# Patient Record
Sex: FEMALE | Race: WHITE | Employment: OTHER | URBAN - METROPOLITAN AREA
[De-identification: names, ages, dates, MRNs, and addresses within clinical notes are randomized per-mention and may not be internally consistent; named-entity substitution may affect disease eponyms.]

---

## 2018-04-13 ENCOUNTER — HOSPITAL ENCOUNTER (OUTPATIENT)
Dept: BONE DENSITY | Age: 58
Discharge: HOME | End: 2018-04-13
Attending: INTERNAL MEDICINE
Payer: COMMERCIAL

## 2018-04-13 DIAGNOSIS — N95.1 SYMPTOMATIC MENOPAUSAL OR FEMALE CLIMACTERIC STATES: ICD-10-CM

## 2018-04-13 PROCEDURE — 77080 DXA BONE DENSITY AXIAL: CPT

## 2020-01-23 ENCOUNTER — IMPORTED ENCOUNTER (OUTPATIENT)
Dept: URBAN - METROPOLITAN AREA CLINIC 1 | Facility: CLINIC | Age: 60
End: 2020-01-23

## 2020-01-23 PROBLEM — H10.023: Noted: 2020-01-23

## 2020-01-23 PROCEDURE — 92002 INTRM OPH EXAM NEW PATIENT: CPT

## 2020-01-23 NOTE — PATIENT DISCUSSION
1.  Bacterial Conjunctivitis OU - due to contact lens overwear. Begin Tobradex TID OU (erx). No contact lens wear at this time. Consider refitting CTLs once resolved. Return for an appointment in 1 week 10 with Dr. Garland Borwn.

## 2020-01-23 NOTE — PATIENT DISCUSSION
Begin Tobradex TID OU (erx). No contact lens wear. Return for an appointment in 1 week 10 with Dr. Sharmin Stuart.

## 2020-11-03 ENCOUNTER — TRANSCRIBE ORDER (OUTPATIENT)
Dept: SCHEDULING | Age: 60
End: 2020-11-03

## 2020-11-03 DIAGNOSIS — K58.1 IRRITABLE BOWEL SYNDROME WITH CONSTIPATION: ICD-10-CM

## 2020-11-03 DIAGNOSIS — R10.12 LEFT UPPER QUADRANT ABDOMINAL PAIN: Primary | ICD-10-CM

## 2021-02-02 ENCOUNTER — TRANSCRIBE ORDER (OUTPATIENT)
Dept: SCHEDULING | Age: 61
End: 2021-02-02

## 2021-02-02 DIAGNOSIS — Z13.6 SCREENING FOR ISCHEMIC HEART DISEASE: Primary | ICD-10-CM

## 2021-02-04 ENCOUNTER — OFFICE VISIT (OUTPATIENT)
Dept: ORTHOPEDIC SURGERY | Age: 61
End: 2021-02-04
Payer: COMMERCIAL

## 2021-02-04 VITALS
SYSTOLIC BLOOD PRESSURE: 96 MMHG | DIASTOLIC BLOOD PRESSURE: 65 MMHG | WEIGHT: 173.6 LBS | HEART RATE: 70 BPM | TEMPERATURE: 98.6 F | BODY MASS INDEX: 26.79 KG/M2

## 2021-02-04 DIAGNOSIS — M79.604 RIGHT LEG PAIN: Primary | ICD-10-CM

## 2021-02-04 DIAGNOSIS — M70.61 TROCHANTERIC BURSITIS OF RIGHT HIP: ICD-10-CM

## 2021-02-04 DIAGNOSIS — M76.31 IT BAND SYNDROME, RIGHT: ICD-10-CM

## 2021-02-04 PROCEDURE — 99203 OFFICE O/P NEW LOW 30 MIN: CPT | Performed by: PHYSICIAN ASSISTANT

## 2021-02-04 PROCEDURE — 73502 X-RAY EXAM HIP UNI 2-3 VIEWS: CPT | Performed by: PHYSICIAN ASSISTANT

## 2021-02-04 PROCEDURE — 73552 X-RAY EXAM OF FEMUR 2/>: CPT | Performed by: PHYSICIAN ASSISTANT

## 2021-02-04 NOTE — PROGRESS NOTES
Patient: Juan Mata                MRN: [id number]       SSN: [id number]  YOB: 1960        AGE: 61 y.o. SEX: female          PCP: Elsa Gaming MD  02/04/21    Chief Complaint   Patient presents with    Leg Pain     right       HISTORY:  Juan Mata is a 61 y.o. female with a 2-week history of worsening right upper lateral femoral pain radiating down to the right knee. No history of trauma. She has a relative who was just diagnosed with a bone cancer and is worried that she may have a bone cancer as well. Pain prohibits her ability to lay comfortably on her right side. Pain also limits her ability to stand for only short periods and walk short distances. She has tried over-the-counter Motrin Tylenol and Aleve for symptom relief does note some improvement with Aleve 1 tablet twice daily. Pain at rest dull and aching characteristic to the right upper outer thigh rating at a 2-3 on a 10 point scale and with activity pain increases upwards of an 8-9 110 point scale.       Pain Assessment  2/4/2021   Location of Pain Leg   Location Modifiers Right   Severity of Pain 3   Quality of Pain Aching   Frequency of Pain Intermittent   Aggravating Factors Standing   Relieving Factors Nothing           No results found for: HBA1C, HGBE8, FGZ5KEYN, ZZG1GLZZ  Weight Metrics 2/4/2021 5/17/2012 9/9/2011 7/12/2011 6/23/2011   Weight 173 lb 9.6 oz 170 lb 173 lb 173 lb 175 lb   BMI 26.79 kg/m2 26.22 kg/m2 26.68 kg/m2 26.68 kg/m2 -            Problem List Items Addressed This Visit     None      Visit Diagnoses     Right leg pain    -  Primary    Relevant Orders    AMB POC XRAY, FEMUR, MIN 2 VIEWS (Completed)    AMB POC X-RAY RADEX HIP UNI WITH PELVIS 2-3 VIEWS (Completed)    Trochanteric bursitis of right hip        It band syndrome, right              PAST MEDICAL HISTORY:   Past Medical History:   Diagnosis Date    Migraine        PAST SURGICAL HISTORY:   Past Surgical History:   Procedure Laterality Date    HX HYSTERECTOMY         ALLERGIES: No Known Allergies     CURRENT MEDICATIONS:  A list of medications prior to the time of admission include:  Prior to Admission medications    Medication Sig Start Date End Date Taking? Authorizing Provider   sertraline (ZOLOFT) 100 mg tablet Take  by mouth daily. Yes Provider, Historical   alprazolam (XANAX) 1 mg tablet Take 1 mg by mouth nightly as needed. Yes Provider, Historical   gabapentin (NEURONTIN) 400 mg capsule Take 100 mg by mouth daily. 3/11/11  Yes Provider, Historical   Methen-Hyos-M Blue-BA-PhSal (PROSED DS) tablet Take 1 Tab by mouth three (3) times daily. 7/12/11   Quinn Salazar MD       FAMILY HISTORY: History reviewed. No pertinent family history. SOCIAL HISTORY:   Social History     Socioeconomic History    Marital status:      Spouse name: Not on file    Number of children: Not on file    Years of education: Not on file    Highest education level: Not on file   Tobacco Use    Smoking status: Never Smoker    Smokeless tobacco: Never Used   Substance and Sexual Activity    Alcohol use: No    Drug use: No       ROS:No CP, No SOB, No fever/chills nor night sweats. No headaches, vision abnormalities to include double and oral loss of vision. No hearing abnormalities. Musculoskeletal pain per HPI. Pain is exacerbated positionally. Pt denies h/o spinal surgery, injections, or PT/chiropractor. Self treated with less than adequate relief on oral antiinflammatories. . Pt denies change in bowel or bladder habits. Pt denies fever, weight loss, or skin changes. EXAM:  Patient alert and oriented x 3,   CN II-XII grossly intact  Sitting comfortably in the exam room, interacting with conversation with pleasant affect. Breathing appears regular effortless with no visible usage of accessory muscles  Distal cap refill intact at 2/2 Alexander UE / LE.   Neuro intact Alexander UE/LE to noxious stimuli    Ortho Specific exam:    Right lower extremity reveals no warmth erythema edema ecchymosis or effusion. Patient has hip flexor strength at 5/5 on the right against resistance. Assisted at bedside right knee flexed at 90 degrees passive internal/external rotation 15 and 18 degrees respectively without pain. Point tenderness is noted over the greater trochanteric region with palpable tenderness extending proximally 4 cm from the P OMT and distally 12 cm down the IT band. Right knee range of motion 100 degrees -0 today with patella tracking midline and slight crepitation. X-rays: Pa Henley 2/4/2021 AP pelvis and a right femur reveals early degenerative changes in the femoral acetabular joint space with mild spurring seen at the superior and inferior rim of the acetabulum. No soft tissue calcifications identified. IMPRESSION:      ICD-10-CM ICD-9-CM    1. Right leg pain  M79.604 729.5 AMB POC XRAY, FEMUR, MIN 2 VIEWS      AMB POC X-RAY RADEX HIP UNI WITH PELVIS 2-3 VIEWS   2. Trochanteric bursitis of right hip  M70.61 726.5    3. It band syndrome, right  M76.31 728.89         PLAN: Today we discussed alternatives to care to include but not limited to increasing her Aleve to 2 tablets p.o. twice daily with food. She agreed. We also discussed the possibility of physical therapy for trochanteric bursal stretching and home exercise program.  She declined. For her acute pain today she was offered a cortisone injection but declined. She will follow with our office on a as needed basis. Today her x-rays reviewed copies provided all of her questions answered to her satisfaction. RICE discussed at length and patient will comply per guidelines. Patient provided a reminder for a \"due or due soon\" health maintenance. I have asked the patient to schedule an appointment with their primary care provider for follow-up on general health maintenance concerns.     Today all the patient's questions were answered to their satisfaction. Copies of x-rays reviewed if obtained this visit, and provided to patient. Dictation disclaimer:  Please note that this dictation was completed with [employer]ov[employer], the computer voice recognition software. Quite often unanticipated grammatical, syntax, homophones, and other interpretive errors are inadvertently transcribed by the computer software. Please disregard these errors. Please excuse any errors that have escaped final proofreading. Bambi Arnold  APA, APC, MPAS, PA-C  [facility]

## 2021-02-23 ENCOUNTER — HOSPITAL ENCOUNTER (OUTPATIENT)
Dept: CT IMAGING | Age: 61
Discharge: HOME | End: 2021-02-23
Attending: INTERNAL MEDICINE

## 2021-02-23 DIAGNOSIS — Z13.6 SCREENING FOR ISCHEMIC HEART DISEASE: ICD-10-CM

## 2021-02-23 PROCEDURE — 75571 CT HRT W/O DYE W/CA TEST: CPT

## 2021-03-04 ENCOUNTER — TELEPHONE (OUTPATIENT)
Dept: OTHER | Age: 61
End: 2021-03-04

## 2021-03-04 NOTE — TELEPHONE ENCOUNTER
Patient identity verified and matched to demographic data. Patient notified of Coronary Calcium Score of  0       Patient verbalized understanding of results, patient education, and follow up care.

## 2021-06-22 ENCOUNTER — TRANSCRIBE ORDER (OUTPATIENT)
Dept: SCHEDULING | Age: 61
End: 2021-06-22

## 2021-07-06 ENCOUNTER — TRANSCRIBE ORDER (OUTPATIENT)
Dept: SCHEDULING | Age: 61
End: 2021-07-06

## 2021-07-06 DIAGNOSIS — Z13.820 SPECIAL SCREENING FOR OSTEOPOROSIS: Primary | ICD-10-CM

## 2021-07-22 ENCOUNTER — HOSPITAL ENCOUNTER (OUTPATIENT)
Dept: BONE DENSITY | Age: 61
Discharge: HOME | End: 2021-07-22
Attending: INTERNAL MEDICINE
Payer: COMMERCIAL

## 2021-07-22 DIAGNOSIS — Z13.820 SPECIAL SCREENING FOR OSTEOPOROSIS: ICD-10-CM

## 2021-07-22 PROCEDURE — 77080 DXA BONE DENSITY AXIAL: CPT

## 2021-08-02 ENCOUNTER — HOSPITAL ENCOUNTER (OUTPATIENT)
Dept: GENERAL RADIOLOGY | Age: 61
Discharge: HOME | End: 2021-08-02
Payer: COMMERCIAL

## 2021-08-02 DIAGNOSIS — K59.04 CHRONIC IDIOPATHIC CONSTIPATION: ICD-10-CM

## 2021-08-02 PROCEDURE — 74018 RADEX ABDOMEN 1 VIEW: CPT

## 2021-08-04 ENCOUNTER — HOSPITAL ENCOUNTER (OUTPATIENT)
Dept: GENERAL RADIOLOGY | Age: 61
Discharge: HOME | End: 2021-08-04
Payer: COMMERCIAL

## 2021-08-04 DIAGNOSIS — K59.09 CHRONIC CONSTIPATION: ICD-10-CM

## 2021-08-04 PROCEDURE — 74018 RADEX ABDOMEN 1 VIEW: CPT

## 2021-08-06 ENCOUNTER — HOSPITAL ENCOUNTER (OUTPATIENT)
Dept: GENERAL RADIOLOGY | Age: 61
Discharge: HOME | End: 2021-08-06
Payer: COMMERCIAL

## 2021-08-06 DIAGNOSIS — K59.00 CONSTIPATION: ICD-10-CM

## 2021-08-06 PROCEDURE — 74018 RADEX ABDOMEN 1 VIEW: CPT

## 2021-11-10 ENCOUNTER — IMPORTED ENCOUNTER (OUTPATIENT)
Dept: URBAN - METROPOLITAN AREA CLINIC 1 | Facility: CLINIC | Age: 61
End: 2021-11-10

## 2021-11-10 PROBLEM — H52.4: Noted: 2021-11-10

## 2021-11-10 PROBLEM — H52.03: Noted: 2021-11-10

## 2021-11-10 PROCEDURE — 92015 DETERMINE REFRACTIVE STATE: CPT

## 2021-11-10 PROCEDURE — 92014 COMPRE OPH EXAM EST PT 1/>: CPT

## 2021-11-10 NOTE — PATIENT DISCUSSION
1.  Hyperopia: Rx was given for corrective spectacles if indicated. 2.  Presbyopia 3. Dry Eyes w/ PEK OU - ATs BID OU CTL trials orderedReturn for an appointment in 1 week cc after trial  with Dr. Brad García.

## 2021-11-22 ENCOUNTER — IMPORTED ENCOUNTER (OUTPATIENT)
Dept: URBAN - METROPOLITAN AREA CLINIC 1 | Facility: CLINIC | Age: 61
End: 2021-11-22

## 2021-11-22 NOTE — PATIENT DISCUSSION
1.  CC today -- patient happy with vision and comfort. CTL RX finalized and given to patient today. Return for an appointment in 1 year 40/cc with Dr. Araceli Stubbs.

## 2022-04-02 ASSESSMENT — KERATOMETRY
OD_K1POWER_DIOPTERS: 42.25
OS_K1POWER_DIOPTERS: 42.00
OS_K2POWER_DIOPTERS: 43.00
OD_AXISANGLE_DEGREES: 177
OD_AXISANGLE2_DEGREES: 087
OS_AXISANGLE2_DEGREES: 086
OS_AXISANGLE_DEGREES: 176
OD_K2POWER_DIOPTERS: 43.00

## 2022-04-02 ASSESSMENT — VISUAL ACUITY
OD_CC: J16
OD_SC: 20/25
OS_CC: J1
OD_CC: 20/25-2
OD_CC: 20/25
OS_CC: J2
OS_SC: 20/50-1
OS_CC: 20/25

## 2022-04-02 ASSESSMENT — TONOMETRY
OD_IOP_MMHG: 15
OS_IOP_MMHG: 16
OS_IOP_MMHG: 17
OD_IOP_MMHG: 15

## 2022-04-20 ENCOUNTER — TRANSCRIBE ORDER (OUTPATIENT)
Dept: SCHEDULING | Age: 62
End: 2022-04-20

## 2022-04-20 DIAGNOSIS — F17.211 CIGARETTE NICOTINE DEPENDENCE IN REMISSION: Primary | ICD-10-CM

## 2022-05-09 ENCOUNTER — NURSE NAVIGATOR (OUTPATIENT)
Dept: OTHER | Age: 62
End: 2022-05-09

## 2022-05-09 NOTE — PROGRESS NOTES
LMOVM regarding prescreening for LDCT lung cancer screening scheduled for 5/10/2022.  Prescreening must be complete prior to exam.      AMIRAH WalterN, RN, [address] [facility] Nurse Navigator

## 2022-05-09 NOTE — PROGRESS NOTES
Referring Provider: Eli Azar MD      Lung Cancer Risk Profile:   Age: 64  Gender: Female  Height: 67\"  Weight: 165#    Smoking History:  Smoking Status: past use  # years smokin  # years quit: 15  Packs/day: 1  Pack years: 35    Patient discussed smoking cessation with PCP: Unknown    Patient participated in shared decision making process with PCP: Unknown    Patient is currently experiencing symptoms: No, per patient report    If yes what symptoms:     Co-Morbidities:      Cancer History:      Additional Risk Factors:   Exposure to second hand smoke      Patient's smoking history discussed via phone. Patient meets LDCT lung cancer screening criteria. Appointment scheduled for 5/10/2022 verified with Ms. Shashank Murphy.       AMIRAH KaplanN, RN, [address] [facility] Nurse Navigator

## 2022-05-10 ENCOUNTER — HOSPITAL ENCOUNTER (OUTPATIENT)
Dept: CT IMAGING | Age: 62
Discharge: HOME | End: 2022-05-10
Attending: INTERNAL MEDICINE
Payer: COMMERCIAL

## 2022-05-10 VITALS — HEIGHT: 66 IN | BODY MASS INDEX: 27.32 KG/M2 | WEIGHT: 170 LBS

## 2022-05-10 DIAGNOSIS — F17.211 CIGARETTE NICOTINE DEPENDENCE IN REMISSION: ICD-10-CM

## 2022-05-10 PROCEDURE — 71271 CT THORAX LUNG CANCER SCR C-: CPT

## 2022-06-20 ENCOUNTER — DOCUMENTATION ONLY (OUTPATIENT)
Dept: PULMONOLOGY | Age: 62
End: 2022-06-20

## 2022-06-20 NOTE — PROGRESS NOTES
Called pt to екатерина np appt&pft -post covid sob-Dr Morgan Concepcion will call back to екатерина-ct [email or web address] in pending file cabinet

## 2023-04-18 DIAGNOSIS — R94.30 ABNORMAL RESULTS OF CARDIOVASCULAR FUNCTION STUDIES: Primary | ICD-10-CM

## 2023-04-18 DIAGNOSIS — R00.2 PALPITATIONS: ICD-10-CM

## 2023-07-07 ENCOUNTER — OFFICE VISIT (OUTPATIENT)
Age: 63
End: 2023-07-07
Payer: COMMERCIAL

## 2023-07-07 VITALS
HEART RATE: 74 BPM | DIASTOLIC BLOOD PRESSURE: 72 MMHG | SYSTOLIC BLOOD PRESSURE: 102 MMHG | BODY MASS INDEX: 27.48 KG/M2 | OXYGEN SATURATION: 97 % | HEIGHT: 66 IN | WEIGHT: 171 LBS

## 2023-07-07 DIAGNOSIS — R94.31 ABNORMAL EKG: ICD-10-CM

## 2023-07-07 DIAGNOSIS — I71.21 ANEURYSM OF ASCENDING AORTA WITHOUT RUPTURE (HCC): ICD-10-CM

## 2023-07-07 DIAGNOSIS — R07.9 CHEST PAIN, UNSPECIFIED TYPE: Primary | ICD-10-CM

## 2023-07-07 PROCEDURE — 99204 OFFICE O/P NEW MOD 45 MIN: CPT | Performed by: INTERNAL MEDICINE

## 2023-07-07 RX ORDER — ESCITALOPRAM OXALATE 20 MG/1
20 TABLET ORAL DAILY
COMMUNITY
Start: 2023-06-14

## 2023-07-07 RX ORDER — LINACLOTIDE 290 UG/1
290 CAPSULE, GELATIN COATED ORAL DAILY
COMMUNITY
Start: 2023-06-14

## 2023-07-07 ASSESSMENT — ANXIETY QUESTIONNAIRES
3. WORRYING TOO MUCH ABOUT DIFFERENT THINGS: 0
2. NOT BEING ABLE TO STOP OR CONTROL WORRYING: 0
IF YOU CHECKED OFF ANY PROBLEMS ON THIS QUESTIONNAIRE, HOW DIFFICULT HAVE THESE PROBLEMS MADE IT FOR YOU TO DO YOUR WORK, TAKE CARE OF THINGS AT HOME, OR GET ALONG WITH OTHER PEOPLE: NOT DIFFICULT AT ALL
7. FEELING AFRAID AS IF SOMETHING AWFUL MIGHT HAPPEN: 0
GAD7 TOTAL SCORE: 0
5. BEING SO RESTLESS THAT IT IS HARD TO SIT STILL: 0
6. BECOMING EASILY ANNOYED OR IRRITABLE: 0
4. TROUBLE RELAXING: 0
1. FEELING NERVOUS, ANXIOUS, OR ON EDGE: 0

## 2023-07-07 ASSESSMENT — PATIENT HEALTH QUESTIONNAIRE - PHQ9
2. FEELING DOWN, DEPRESSED OR HOPELESS: 0
SUM OF ALL RESPONSES TO PHQ QUESTIONS 1-9: 0
SUM OF ALL RESPONSES TO PHQ9 QUESTIONS 1 & 2: 0
SUM OF ALL RESPONSES TO PHQ QUESTIONS 1-9: 0
SUM OF ALL RESPONSES TO PHQ QUESTIONS 1-9: 0
1. LITTLE INTEREST OR PLEASURE IN DOING THINGS: 0
SUM OF ALL RESPONSES TO PHQ QUESTIONS 1-9: 0

## 2023-07-07 ASSESSMENT — ENCOUNTER SYMPTOMS
COUGH: 0
SORE THROAT: 0
ABDOMINAL DISTENTION: 0
VOMITING: 0
SHORTNESS OF BREATH: 0
ABDOMINAL PAIN: 0
NAUSEA: 0

## 2023-07-07 NOTE — PROGRESS NOTES
07/07/23     Cindy Bautista  is a 61 y.o. female     Chief Complaint   Patient presents with    New Patient     Self Ref For Chest Pain / Wants To Establish Care    Follow-Up from Hospital     ER Visit : 04-25-23 :Melinda Walden : Chest Pain       HPI    Patient presents for a new office visit. She was referred here by her PCP for evaluation of chest pain. She does not have a prior cardiac history. She underwent a coronary calcium score in 2021 which was 0. She also underwent a CT lung cancer screening of her thorax in May 2022 which showed an incidental finding of an enlarged a sending thoracic aorta measuring between 4.1 and 4.3 cm. She underwent an echocardiogram at the end of April 2023 to evaluate chest pain. This demonstrated preserved LV function, EF 60 to 65%, no regional wall motion abnormality. Mild to moderate tricuspid regurgitation with normal PA pressures. She also was found to have a moderately dilated aortic root. She was seen in the emergency room at [facility] the day after her echocardiogram where she was found to have an abnormal EKG with anteroseptal Q waves, but no acute ST-T abnormalities. Her troponins were negative. She was discharged home with recommendation for an outpatient cardiac work-up. Patient states the chest pain back in April was the most severe which she describes as a substernal pressure which radiated up into her jaw. That has not reoccurred but she does report a daily mild chest discomfort which she describes as a pressure in the middle of her chest which is nonradiating. This is nonexertional in nature. She states that she tries to exercise most days. She will swim in the pool for almost 45 minutes is never noticed any exertional chest tightness. She was also walking daily until it got too hot. No major change in her activity tolerance.     Past Medical History:   Diagnosis Date    Anxiety     Migraine      Current Outpatient Medications

## 2023-07-07 NOTE — PROGRESS NOTES
Salma Riding presents today for   Chief Complaint   Patient presents with    New Patient     Self Ref For Chest Pain / Wants To Establish Care    Follow-Up from Hospital     ER Visit : 04-25-23 :Russellelissa Negro : Chest Pain       Salma Riding preferred language for health care discussion is english/other. Is someone accompanying this pt? no    Is the patient using any DME equipment during OV? no    Depression Screening:  Depression: Not at risk    PHQ-2 Score: 0        Learning Assessment:  Who is the primary learner? Patient    What is the preferred language for health care of the primary learner? ENGLISH    How does the primary learner prefer to learn new concepts? DEMONSTRATION    Answered By patient    Relationship to Learner SELF           Pt currently taking Anticoagulant therapy? no    Pt currently taking Antiplatelet therapy ? no      Coordination of Care:  1. Have you been to the ER, urgent care clinic since your last visit? Hospitalized since your last visit? no    2. Have you seen or consulted any other health care providers outside of the [address] [place] [address] since your last visit? Include any pap smears or colon screening.  no

## 2023-07-31 ENCOUNTER — HOSPITAL ENCOUNTER (OUTPATIENT)
Facility: HOSPITAL | Age: 63
Discharge: HOME | End: 2023-08-03
Attending: INTERNAL MEDICINE
Payer: COMMERCIAL

## 2023-07-31 DIAGNOSIS — R94.31 ABNORMAL EKG: ICD-10-CM

## 2023-07-31 DIAGNOSIS — I71.21 ANEURYSM OF ASCENDING AORTA WITHOUT RUPTURE (HCC): ICD-10-CM

## 2023-07-31 LAB — CREAT UR-MCNC: 0.6 MG/DL (ref 0.6–1.3)

## 2023-07-31 PROCEDURE — 71275 CT ANGIOGRAPHY CHEST: CPT

## 2023-07-31 PROCEDURE — 6360000004 HC RX CONTRAST MEDICATION: Performed by: INTERNAL MEDICINE

## 2023-07-31 PROCEDURE — 82565 ASSAY OF CREATININE: CPT

## 2023-07-31 RX ADMIN — IOPAMIDOL 100 ML: 755 INJECTION, SOLUTION INTRAVENOUS at 10:15

## 2023-12-12 ENCOUNTER — OFFICE VISIT (OUTPATIENT)
Age: 63
End: 2023-12-12
Payer: COMMERCIAL

## 2023-12-12 VITALS — HEIGHT: 67 IN | WEIGHT: 175 LBS | BODY MASS INDEX: 27.47 KG/M2 | TEMPERATURE: 98 F

## 2023-12-12 DIAGNOSIS — M18.0 ARTHRITIS OF CARPOMETACARPAL (CMC) JOINT OF BOTH THUMBS: ICD-10-CM

## 2023-12-12 DIAGNOSIS — M79.642 LEFT HAND PAIN: ICD-10-CM

## 2023-12-12 DIAGNOSIS — M79.641 RIGHT HAND PAIN: Primary | ICD-10-CM

## 2023-12-12 PROCEDURE — 73130 X-RAY EXAM OF HAND: CPT | Performed by: PHYSICIAN ASSISTANT

## 2023-12-12 PROCEDURE — 99204 OFFICE O/P NEW MOD 45 MIN: CPT | Performed by: PHYSICIAN ASSISTANT

## 2023-12-12 NOTE — PROGRESS NOTES
with the [facility] and the [facility] as well as the intake form in the chart in the record. I have reviewed prior medical record(s) in detail regarding this patient in this care appointment. Appropriate for outpatient management. Will discuss supportive treatment, NSAIDS, RICE and orthopedic follow-up. Discussed treatment plan, return precautions, symptomatic relief, and expected time to improvement. All questions answered. Patient is stable for discharge and outpatient management. Medication use, risk/benefit, side effects, and precautions discussed. Care plan outlined and precautions discussed. Results were reviewed with the patient. All medications were reviewed with the patient. All of pt's questions and concerns were addressed. Alarm symptoms and return precautions associated with chief complaint and evaluation were reviewed with the patient in detail. The patient demonstrated adequate understanding. The patient expresses willing compliance with the treatment plan. Special note: Medication management discussed in detail all patient's questions answered to their satisfaction. Patient provided a reminder for a \"due or due soon\" health maintenance. I have asked the patient to schedule an appointment with their primary care provider for follow-up on general health maintenance concerns. Today all the patient's questions were answered to their satisfaction. Copies of x-rays reviewed if obtained this visit, and provided to patient. Dictation disclaimer:  Please note that this dictation was completed with \"Dragon\", the computer voice recognition software. Today's exam was dictated using fluency dictation software (voice recognition software). Occasionally, sound-a-like computer induced   dictation errors will populate the report. Quite often unanticipated grammatical, syntax, homophones, and other interpretive errors are inadvertently transcribed by the computer software.   Please disregard

## 2024-04-26 ENCOUNTER — HOSPITAL ENCOUNTER (OUTPATIENT)
Facility: HOSPITAL | Age: 64
Discharge: HOME | End: 2024-04-26
Payer: COMMERCIAL

## 2024-04-26 DIAGNOSIS — I71.21 ANEURYSM OF ASCENDING AORTA WITHOUT RUPTURE (HCC): ICD-10-CM

## 2024-04-26 LAB — CREAT UR-MCNC: 0.6 MG/DL (ref 0.6–1.3)

## 2024-04-26 PROCEDURE — 82565 ASSAY OF CREATININE: CPT

## 2024-04-26 PROCEDURE — 6360000004 HC RX CONTRAST MEDICATION: Performed by: INTERNAL MEDICINE

## 2024-04-26 PROCEDURE — 71275 CT ANGIOGRAPHY CHEST: CPT

## 2024-04-26 RX ADMIN — IOPAMIDOL 100 ML: 755 INJECTION, SOLUTION INTRAVENOUS at 11:00

## 2024-05-06 ENCOUNTER — HOSPITAL ENCOUNTER (OUTPATIENT)
Facility: HOSPITAL | Age: 64
Setting detail: RECURRING SERIES
Discharge: HOME | End: 2024-05-09
Payer: COMMERCIAL

## 2024-05-06 PROCEDURE — 97535 SELF CARE MNGMENT TRAINING: CPT

## 2024-05-06 PROCEDURE — 97161 PT EVAL LOW COMPLEX 20 MIN: CPT

## 2024-05-06 PROCEDURE — 97110 THERAPEUTIC EXERCISES: CPT

## 2024-05-06 NOTE — PROGRESS NOTES
PT DAILY TREATMENT NOTE/LUMBAR EVAL     Patient Name: Tina Finch    Date: 2024    : 1960  Insurance: Payor: YENI / Plan: JAMI JAIME FEDERAL / Product Type: *No Product type* /      Patient  verified yes     Visit #   Current / Total 1 24   Time   In / Out 12:30 1:10   Pain   In / Out 2/10 0   Subjective Functional Status/Changes: Low back pain radiating into R LE       Treatment Area: Low back Pain. Right hip pain [M25.551]  SUBJECTIVE  Pain Level (0-10 scale): 2/10  []constant []intermittent []improving []worsening []no change since onset    Any medication changes, allergies to medications, adverse drug reactions, diagnosis change, or new procedure performed?: [x] No    [] Yes (see summary sheet for update)  Subjective functional status/changes:     Subjective functional status/changes:     PLOF: Pt has difficulty lying on her back.   Mechanism of Injury: pt report she has had low back pain after hitting her right hip on to something. Pt had x-ray of lumbar spina and right hip and pt was told her symptoms are from sciatic nerve involvement.   Current symptoms/Complaints: 0/10 at best with Tylenol and walking; 8/10 at worst with lying on back; pt reports they are able to sleep through the night.  Previous Treatment/Compliance: Medication, resting, chiropractor  PMHx/Surgical Hx: Anxiety and migraine.   Work Hx: Retired  Living Situation: Lives with significant other in a one story house.   Pt Goals: \"trying to figure out exercises that strengthen my back\"  Barriers: [x]pain []financial []time []transportation []other  Motivation: asking questions, trying to perform skills, and interest   Substance use: []Alcohol []Tobacco []other:   Cognition: A & O x 3        OBJECTIVE/EXAMINATION      20 min [x]Eval        - untimed        Therapeutic Procedures:  Tx Min Billable or 1:1 Min (if diff from Tx Min) Procedure, Rationale, Specifics   10  18206 Therapeutic Exercise (timed):  increase ROM,

## 2024-05-06 NOTE — PROGRESS NOTES
[facility]  [address] [facility] [address] [place] [address] Ph:[phone] Fx: [phone]    PLAN OF CARE/ Statement of Necessity for Physical Therapy Services           Patient name: Tina Finch Start of Care: 2024   Referral source: America Fields MD : 1960    Medical Diagnosis: Other low back pain [M54.59]       Onset Date: 23   Treatment Diagnosis: M54.59  OTHER LOWER BACK PAIN                                     Prior Hospitalization: see medical history Provider#: [id number]   Medications: Verified on Patient Summary List     Comorbidities:  Anxiety and migraine.   Prior Level of Function: Pt has difficulty lying on her back.     The Plan of Care and following information is based on the information from the initial evaluation.    Assessment / key information:    The patient is a 63-year-old female referred to therapy with low back pain. The patient reported a current pain level of 2/10 which gets worsen to 8/10 with lying on her back . The patient reports pain started a year ago after hitting her right hip into something. During the objective assessment, the patient reported no pain during repeated movement in each plane. The patient pointed pain in right QL and SIJ area describing sharp in nature. Additionally, the patient showed positive tenderness, stiffness, and weakness in both lower extremities, resulting in functional limitations. Skilled therapy is recommended to address the above deficits and help the patient return to her prior level of function.     Evaluation Complexity:  History:  LOW Complexity : Zero comorbidities / personal factors that will impact the outcome / POC; Examination:  LOW Complexity : 1-2 Standardized tests and measures addressing body structure, function, activity limitation and / or participation in recreation  ;Presentation:  LOW Complexity : Stable, uncomplicated  ;Clinical Decision Making:  LOW

## 2024-05-07 ENCOUNTER — TELEPHONE (OUTPATIENT)
Facility: HOSPITAL | Age: 64
End: 2024-05-07

## 2024-05-15 ENCOUNTER — TELEPHONE (OUTPATIENT)
Facility: HOSPITAL | Age: 64
End: 2024-05-15

## 2024-05-31 ENCOUNTER — TELEPHONE (OUTPATIENT)
Facility: HOSPITAL | Age: 64
End: 2024-05-31

## 2024-05-31 NOTE — TELEPHONE ENCOUNTER
patient will c/b to schedule. informed her that she's coming up on her 30 day nabeel and had only been seen once.

## 2024-06-25 ENCOUNTER — TELEPHONE (OUTPATIENT)
Facility: HOSPITAL | Age: 64
End: 2024-06-25

## 2024-06-25 ENCOUNTER — HOSPITAL ENCOUNTER (OUTPATIENT)
Facility: HOSPITAL | Age: 64
Setting detail: RECURRING SERIES
Discharge: HOME | End: 2024-06-28
Payer: COMMERCIAL

## 2024-06-25 PROCEDURE — 97535 SELF CARE MNGMENT TRAINING: CPT

## 2024-06-25 PROCEDURE — 97110 THERAPEUTIC EXERCISES: CPT

## 2024-06-25 PROCEDURE — 97112 NEUROMUSCULAR REEDUCATION: CPT

## 2024-06-25 NOTE — TELEPHONE ENCOUNTER
patient decided that she will cx and d/c for now. says she is not having much pain at this moment and will come back in the winter time.

## 2024-06-25 NOTE — PROGRESS NOTES
PHYSICAL / OCCUPATIONAL THERAPY - DAILY TREATMENT NOTE     Patient Name: Tina Finch    Date: 2024    : 1960  Insurance: Payor: YENI / Plan: JAMI JAIME FEDERAL / Product Type: *No Product type* /      Patient  verified Yes     Visit #   Current / Total 2 24   Time   In / Out 1030am 1123am   Pain   In / Out 0 0   Subjective Functional Status/Changes: Pt states she hasn't been to therapy because it got warmer outside and she was able to start using her pool at home and that helped her pain. States she hasn't been having a lot of pain but does have pain at night. States she decided she would come back since she was told she needed a month of therapy. States she really wants to learn exercises to do at home during the winter to help the pain when she can't do the pool activities.    Changes to:  Allergies, Med Hx, Sx Hx?   no       TREATMENT AREA =  Other low back pain [M54.59]    OBJECTIVE        Therapeutic Procedures:  Tx Min Billable or 1:1 Min (if diff from Tx Min) Procedure, Rationale, Specifics   30  10433 Therapeutic Exercise (timed):  increase ROM, strength, coordination, balance, and proprioception to improve patient's ability to progress to PLOF and address remaining functional goals. (see flow sheet as applicable)    Details if applicable:       10  89337 Neuromuscular Re-Education (timed):  improve balance, coordination, kinesthetic sense, posture, core stability and proprioception to improve patient's ability to develop conscious control of individual muscles and awareness of position of extremities in order to progress to PLOF and address remaining functional goals. (see flow sheet as applicable)    Details if applicable:     13  49504 Self Care/Home Management (timed):  improve patient knowledge and understanding of activity modification, physical therapy expectations, procedures and progression, and HEP review  to improve patient's ability to progress to PLOF and address

## 2024-06-25 NOTE — PROGRESS NOTES
[facility]  [address] [facility] [place] [address] - Ph: [phone]   Fx: [phone]    PHYSICAL THERAPY PROGRESS NOTE      Patient name: Tina Finch Start of Care: 2024   Referral source: America Fields MD : 1960               Medical Diagnosis: Other low back pain [M54.59]        Onset Date: 23   Treatment Diagnosis: M54.59  OTHER LOWER BACK PAIN                                     Prior Hospitalization: see medical history Provider#: [id number]   Medications: Verified on Patient Summary List      Comorbidities:  Anxiety and migraine.   Prior Level of Function: Pt has difficulty lying on her back.     Visits from Start of Care: 2    Missed Visits: 0    Goals/Measure of Progress: To be achieved in 12 weeks:    Short Term Goals: To be accomplished in 4 weeks:  Patient will be independent and compliant with HEP to progress toward goals and restore functional mobility.   Eval Status: issued at eval  PN 24: pt reports she has not done the exercises at all     Patient will improve pain in low back to 6/10 at worst to improve household task tolerance.  Eval Status: 8/10 at worst  PN 24: at worst in past two weeks 6/10; MET     Long Term Goals: To be accomplished in 12 weeks:  Patient will improve FOTO score to 76 points to improve functional tolerance for recreational activities.  Eval Status: FOTO 69  FOTO score = an established functional score where 100 = no disability  PN 24: 72, progressing     2.   Pt will demonstrate no upslip on right with good QL and glute medius activation during single leg stance.   Eval Status: Right up slip and weakness of QL and glute medius.  PN 24: pt has good SLS ability      3.   Pt will have 5/5 B hip strength to return to goals of increased joint stability and mechanics while bending and lifting activities.  Eval Status:   MMT L(0-5) R (0-5)   Hip Flexion  4+ 4+   Hip

## 2024-06-27 ENCOUNTER — APPOINTMENT (OUTPATIENT)
Facility: HOSPITAL | Age: 64
End: 2024-06-27
Payer: COMMERCIAL

## 2024-07-18 ENCOUNTER — OFFICE VISIT (OUTPATIENT)
Age: 64
End: 2024-07-18
Payer: COMMERCIAL

## 2024-07-18 VITALS
SYSTOLIC BLOOD PRESSURE: 116 MMHG | OXYGEN SATURATION: 94 % | WEIGHT: 173 LBS | HEART RATE: 76 BPM | BODY MASS INDEX: 27.8 KG/M2 | HEIGHT: 66 IN | DIASTOLIC BLOOD PRESSURE: 78 MMHG

## 2024-07-18 DIAGNOSIS — R94.31 ABNORMAL EKG: ICD-10-CM

## 2024-07-18 DIAGNOSIS — G47.33 OSA ON CPAP: ICD-10-CM

## 2024-07-18 DIAGNOSIS — I71.21 ANEURYSM OF ASCENDING AORTA WITHOUT RUPTURE (HCC): Primary | ICD-10-CM

## 2024-07-18 PROBLEM — H10.31 ACUTE CONJUNCTIVITIS OF RIGHT EYE: Status: ACTIVE | Noted: 2023-09-20

## 2024-07-18 PROBLEM — G45.9 TIA (TRANSIENT ISCHEMIC ATTACK): Status: ACTIVE | Noted: 2023-08-10

## 2024-07-18 PROCEDURE — 99214 OFFICE O/P EST MOD 30 MIN: CPT | Performed by: INTERNAL MEDICINE

## 2024-07-18 PROCEDURE — 93000 ELECTROCARDIOGRAM COMPLETE: CPT | Performed by: INTERNAL MEDICINE

## 2024-07-18 ASSESSMENT — ENCOUNTER SYMPTOMS
ABDOMINAL PAIN: 0
CHEST TIGHTNESS: 0
ABDOMINAL DISTENTION: 0
NAUSEA: 0
SHORTNESS OF BREATH: 0
VOMITING: 0
COUGH: 0
SORE THROAT: 0

## 2024-07-18 ASSESSMENT — PATIENT HEALTH QUESTIONNAIRE - PHQ9
5. POOR APPETITE OR OVEREATING: NOT AT ALL
SUM OF ALL RESPONSES TO PHQ QUESTIONS 1-9: 0
8. MOVING OR SPEAKING SO SLOWLY THAT OTHER PEOPLE COULD HAVE NOTICED. OR THE OPPOSITE, BEING SO FIGETY OR RESTLESS THAT YOU HAVE BEEN MOVING AROUND A LOT MORE THAN USUAL: NOT AT ALL
4. FEELING TIRED OR HAVING LITTLE ENERGY: NOT AT ALL
SUM OF ALL RESPONSES TO PHQ QUESTIONS 1-9: 0
7. TROUBLE CONCENTRATING ON THINGS, SUCH AS READING THE NEWSPAPER OR WATCHING TELEVISION: NOT AT ALL
3. TROUBLE FALLING OR STAYING ASLEEP: NOT AT ALL
SUM OF ALL RESPONSES TO PHQ QUESTIONS 1-9: 0
10. IF YOU CHECKED OFF ANY PROBLEMS, HOW DIFFICULT HAVE THESE PROBLEMS MADE IT FOR YOU TO DO YOUR WORK, TAKE CARE OF THINGS AT HOME, OR GET ALONG WITH OTHER PEOPLE: NOT DIFFICULT AT ALL
1. LITTLE INTEREST OR PLEASURE IN DOING THINGS: NOT AT ALL
6. FEELING BAD ABOUT YOURSELF - OR THAT YOU ARE A FAILURE OR HAVE LET YOURSELF OR YOUR FAMILY DOWN: NOT AT ALL
9. THOUGHTS THAT YOU WOULD BE BETTER OFF DEAD, OR OF HURTING YOURSELF: NOT AT ALL
2. FEELING DOWN, DEPRESSED OR HOPELESS: NOT AT ALL
SUM OF ALL RESPONSES TO PHQ QUESTIONS 1-9: 0
SUM OF ALL RESPONSES TO PHQ9 QUESTIONS 1 & 2: 0

## 2024-07-18 ASSESSMENT — ANXIETY QUESTIONNAIRES
2. NOT BEING ABLE TO STOP OR CONTROL WORRYING: NOT AT ALL
3. WORRYING TOO MUCH ABOUT DIFFERENT THINGS: NOT AT ALL
6. BECOMING EASILY ANNOYED OR IRRITABLE: NOT AT ALL
GAD7 TOTAL SCORE: 0
7. FEELING AFRAID AS IF SOMETHING AWFUL MIGHT HAPPEN: NOT AT ALL
5. BEING SO RESTLESS THAT IT IS HARD TO SIT STILL: NOT AT ALL
1. FEELING NERVOUS, ANXIOUS, OR ON EDGE: NOT AT ALL
4. TROUBLE RELAXING: NOT AT ALL

## 2024-07-18 NOTE — PROGRESS NOTES
07/18/24     Tina Finch  is a 64 y.o. female     Chief Complaint   Patient presents with    Follow-up     1 year       HPI    Patient presents for a follow-up office visit.  She was initially referred here by her PCP for evaluation of chest pain.  She does not have a prior cardiac history.  She underwent a coronary calcium score in 2021 which was 0.  She also underwent a CT lung cancer screening of her thorax in May 2022 which showed an incidental finding of an enlarged a sending thoracic aorta measuring between 4.1 and 4.3 cm.  She underwent an echocardiogram at the end of April 2023 to evaluate chest pain.  This demonstrated preserved LV function, EF 60%, no regional wall motion abnormality.  Mild to moderate tricuspid regurgitation with normal PA pressures.  She also was found to have a moderately dilated aortic root.    She was seen in the emergency room at [facility] the day after her echocardiogram where she was found to have an abnormal EKG with anteroseptal Q waves, but no acute ST-T abnormalities.  Her troponins were negative.  She was discharged home with recommendation for an outpatient cardiac work-up.    Patient underwent a pharmacologic nuclear stress test in August 2023 which is a normal low risk study.  No evidence of ischemia or infarction, EF 67%.  She also underwent a CTA of her abdomen and thorax in August 2023 which showed a stable sized ascending aortic aneurysm measuring 4.4 cm in maximal diameter.  There is no evidence of dissection.  Her PCP ordered a follow-up CT of her thorax in April 2024 which was unchanged.  She was last seen in our office about 1 year ago.  Since last visit, she been feeling well.  She has had no recurrent chest pain, no shortness of breath, leg swelling.  No major change in her activity level.    Past Medical History:   Diagnosis Date    Anxiety     Migraine      Current Outpatient Medications   Medication Sig Dispense Refill    escitalopram

## 2024-07-18 NOTE — PROGRESS NOTES
Tina Fang Finch presents today for   Chief Complaint   Patient presents with    Follow-up     1 year       Tina Finch preferred language for health care discussion is english/other.    Is someone accompanying this pt? no    Is the patient using any DME equipment during OV? no    Depression Screening:  Depression: Not at risk (7/18/2024)    PHQ-2     PHQ-2 Score: 0        Learning Assessment:  Who is the primary learner? Patient    What is the preferred language for health care of the primary learner? ENGLISH    How does the primary learner prefer to learn new concepts? DEMONSTRATION    Answered By patient    Relationship to Learner SELF           Pt currently taking Anticoagulant therapy? no    Pt currently taking Antiplatelet therapy ? no      Coordination of Care:  1. Have you been to the ER, urgent care clinic since your last visit? Hospitalized since your last visit? no    2. Have you seen or consulted any other health care providers outside of the [facility] since your last visit? Include any pap smears or colon screening. no

## 2025-01-08 ENCOUNTER — HOSPITAL ENCOUNTER (EMERGENCY)
Facility: HOSPITAL | Age: 65
Discharge: HOME | End: 2025-01-08
Attending: EMERGENCY MEDICINE
Payer: COMMERCIAL

## 2025-01-08 ENCOUNTER — APPOINTMENT (OUTPATIENT)
Facility: HOSPITAL | Age: 65
End: 2025-01-08
Payer: COMMERCIAL

## 2025-01-08 VITALS
SYSTOLIC BLOOD PRESSURE: 112 MMHG | HEART RATE: 66 BPM | TEMPERATURE: 97.8 F | DIASTOLIC BLOOD PRESSURE: 74 MMHG | WEIGHT: 168 LBS | OXYGEN SATURATION: 97 % | RESPIRATION RATE: 20 BRPM | BODY MASS INDEX: 26.37 KG/M2 | HEIGHT: 67 IN

## 2025-01-08 DIAGNOSIS — J01.40 ACUTE PANSINUSITIS, RECURRENCE NOT SPECIFIED: ICD-10-CM

## 2025-01-08 DIAGNOSIS — R42 DIZZINESS: Primary | ICD-10-CM

## 2025-01-08 LAB
ALBUMIN SERPL-MCNC: 4 G/DL (ref 3.4–5)
ALBUMIN/GLOB SERPL: 1.3 (ref 0.8–1.7)
ALP SERPL-CCNC: 61 U/L (ref 45–117)
ALT SERPL-CCNC: 18 U/L (ref 13–56)
ANION GAP SERPL CALC-SCNC: 6 MMOL/L (ref 3–18)
AST SERPL-CCNC: 10 U/L (ref 10–38)
BASOPHILS # BLD: 0.05 K/UL (ref 0–0.1)
BASOPHILS NFR BLD: 0.8 % (ref 0–2)
BILIRUB SERPL-MCNC: 0.4 MG/DL (ref 0.2–1)
BUN SERPL-MCNC: 14 MG/DL (ref 7–18)
BUN/CREAT SERPL: 21 (ref 12–20)
CALCIUM SERPL-MCNC: 8.7 MG/DL (ref 8.5–10.1)
CHLORIDE SERPL-SCNC: 109 MMOL/L (ref 100–111)
CO2 SERPL-SCNC: 28 MMOL/L (ref 21–32)
CREAT SERPL-MCNC: 0.66 MG/DL (ref 0.6–1.3)
DIFFERENTIAL METHOD BLD: ABNORMAL
EOSINOPHIL # BLD: 0.07 K/UL (ref 0–0.4)
EOSINOPHIL NFR BLD: 1.2 % (ref 0–5)
ERYTHROCYTE [DISTWIDTH] IN BLOOD BY AUTOMATED COUNT: 12.9 % (ref 11.6–14.5)
GLOBULIN SER CALC-MCNC: 3.1 G/DL (ref 2–4)
GLUCOSE BLD STRIP.AUTO-MCNC: 104 MG/DL (ref 70–110)
GLUCOSE SERPL-MCNC: 103 MG/DL (ref 74–99)
HCT VFR BLD AUTO: 40.2 % (ref 35–45)
HGB BLD-MCNC: 13 G/DL (ref 12–16)
IMM GRANULOCYTES # BLD AUTO: 0.01 K/UL (ref 0–0.04)
IMM GRANULOCYTES NFR BLD AUTO: 0.2 % (ref 0–0.5)
LYMPHOCYTES # BLD: 1.75 K/UL (ref 0.9–3.6)
LYMPHOCYTES NFR BLD: 29.7 % (ref 21–52)
MAGNESIUM SERPL-MCNC: 2.2 MG/DL (ref 1.6–2.6)
MCH RBC QN AUTO: 30.1 PG (ref 24–34)
MCHC RBC AUTO-ENTMCNC: 32.3 G/DL (ref 31–37)
MCV RBC AUTO: 93.1 FL (ref 78–100)
MONOCYTES # BLD: 0.46 K/UL (ref 0.05–1.2)
MONOCYTES NFR BLD: 7.8 % (ref 3–10)
NEUTS SEG # BLD: 3.55 K/UL (ref 1.8–8)
NEUTS SEG NFR BLD: 60.3 % (ref 40–73)
NRBC # BLD: 0 K/UL (ref 0–0.01)
NRBC BLD-RTO: 0 PER 100 WBC
PLATELET # BLD AUTO: 205 K/UL (ref 135–420)
PMV BLD AUTO: 8.9 FL (ref 9.2–11.8)
POTASSIUM SERPL-SCNC: 4 MMOL/L (ref 3.5–5.5)
PROT SERPL-MCNC: 7.1 G/DL (ref 6.4–8.2)
RBC # BLD AUTO: 4.32 M/UL (ref 4.2–5.3)
SODIUM SERPL-SCNC: 143 MMOL/L (ref 136–145)
TROPONIN I SERPL HS-MCNC: 3 NG/L (ref 0–54)
TROPONIN I SERPL HS-MCNC: <3 NG/L (ref 0–54)
WBC # BLD AUTO: 5.9 K/UL (ref 4.6–13.2)

## 2025-01-08 PROCEDURE — 70450 CT HEAD/BRAIN W/O DYE: CPT

## 2025-01-08 PROCEDURE — 80053 COMPREHEN METABOLIC PANEL: CPT

## 2025-01-08 PROCEDURE — 83735 ASSAY OF MAGNESIUM: CPT

## 2025-01-08 PROCEDURE — 82962 GLUCOSE BLOOD TEST: CPT

## 2025-01-08 PROCEDURE — 93005 ELECTROCARDIOGRAM TRACING: CPT | Performed by: EMERGENCY MEDICINE

## 2025-01-08 PROCEDURE — 99284 EMERGENCY DEPT VISIT MOD MDM: CPT

## 2025-01-08 PROCEDURE — 6360000002 HC RX W HCPCS: Performed by: EMERGENCY MEDICINE

## 2025-01-08 PROCEDURE — 96374 THER/PROPH/DIAG INJ IV PUSH: CPT

## 2025-01-08 PROCEDURE — 85025 COMPLETE CBC W/AUTO DIFF WBC: CPT

## 2025-01-08 PROCEDURE — 84484 ASSAY OF TROPONIN QUANT: CPT

## 2025-01-08 RX ORDER — ONDANSETRON 2 MG/ML
4 INJECTION INTRAMUSCULAR; INTRAVENOUS
Status: COMPLETED | OUTPATIENT
Start: 2025-01-08 | End: 2025-01-08

## 2025-01-08 RX ORDER — METHYLPREDNISOLONE 4 MG/1
TABLET ORAL
Qty: 1 KIT | Refills: 0 | Status: SHIPPED | OUTPATIENT
Start: 2025-01-08 | End: 2025-01-14

## 2025-01-08 RX ORDER — MECLIZINE HYDROCHLORIDE 25 MG/1
25 TABLET ORAL 3 TIMES DAILY PRN
Qty: 15 TABLET | Refills: 0 | Status: SHIPPED | OUTPATIENT
Start: 2025-01-08 | End: 2025-01-18

## 2025-01-08 RX ORDER — ONDANSETRON 8 MG/1
8 TABLET, ORALLY DISINTEGRATING ORAL EVERY 8 HOURS PRN
Qty: 10 TABLET | Refills: 0 | Status: SHIPPED | OUTPATIENT
Start: 2025-01-08

## 2025-01-08 RX ADMIN — ONDANSETRON 4 MG: 2 INJECTION, SOLUTION INTRAMUSCULAR; INTRAVENOUS at 11:31

## 2025-01-08 ASSESSMENT — PAIN SCALES - GENERAL: PAINLEVEL_OUTOF10: 0

## 2025-01-08 ASSESSMENT — LIFESTYLE VARIABLES
HOW OFTEN DO YOU HAVE A DRINK CONTAINING ALCOHOL: NEVER
HOW MANY STANDARD DRINKS CONTAINING ALCOHOL DO YOU HAVE ON A TYPICAL DAY: PATIENT DOES NOT DRINK

## 2025-01-08 ASSESSMENT — PAIN - FUNCTIONAL ASSESSMENT: PAIN_FUNCTIONAL_ASSESSMENT: 0-10

## 2025-01-08 ASSESSMENT — ENCOUNTER SYMPTOMS
CHEST TIGHTNESS: 0
SINUS PRESSURE: 1
EYES NEGATIVE: 1
ABDOMINAL PAIN: 0

## 2025-01-08 NOTE — ED PROVIDER NOTES
0    ondansetron (ZOFRAN-ODT) 8 MG TBDP disintegrating tablet Take 1 tablet by mouth every 8 hours as needed for Nausea or Vomiting 10 tablet 0    methylPREDNISolone (MEDROL, JENNIFER,) 4 MG tablet Take by mouth. 1 kit 0    LINZESS 290 MCG CAPS capsule Take 1 capsule by mouth daily      gabapentin (NEURONTIN) 400 MG capsule Take 1 capsule by mouth daily.      escitalopram (LEXAPRO) 20 MG tablet Take 1 tablet by mouth daily      ALPRAZolam (XANAX) 1 MG tablet Take 1 tablet by mouth 3 times daily as needed.         Past History     Past Medical History:  Past Medical History:   Diagnosis Date    Anxiety     Migraine        Past Surgical History:  Past Surgical History:   Procedure Laterality Date    HYSTERECTOMY (CERVIX STATUS UNKNOWN)         Family History:  Family History   Problem Relation Age of Onset    No Known Problems Mother     No Known Problems Father     No Known Problems Sister     No Known Problems Brother     No Known Problems Maternal Aunt     No Known Problems Maternal Uncle     No Known Problems Paternal Aunt     No Known Problems Paternal Uncle     No Known Problems Maternal Grandmother     No Known Problems Maternal Grandfather     No Known Problems Paternal Grandmother     No Known Problems Paternal Grandfather     No Known Problems Other        Social History:  Social History     Tobacco Use    Smoking status: Never    Smokeless tobacco: Never   Vaping Use    Vaping status: Never Used   Substance Use Topics    Alcohol use: Not Currently     Comment: at times    Drug use: No       Allergies:  No Known Allergies      Review of Systems       Review of Systems   Constitutional:  Negative for activity change and fatigue.   HENT:  Positive for congestion and sinus pressure.    Eyes: Negative.    Respiratory:  Negative for chest tightness.    Cardiovascular:  Negative for chest pain.   Gastrointestinal:  Negative for abdominal pain.   Genitourinary:  Negative for dysuria.   Musculoskeletal:  Negative for

## 2025-01-08 NOTE — ED TRIAGE NOTES
Patient presents to ER with c/o dizziness that started yesterday while taking Pilates then it seemed to mostly resolve. Awoke this morning and started to feel the same dizziness and felt like she could not continue to do her Pilates this morning.  Accucheck obtained and Provider at bedside.

## 2025-01-08 NOTE — DISCHARGE INSTRUCTIONS
Your workup was reassuring but I suspect that you have a sinus infection that is causing inflammation in your inner ear.  Take the antibiotics and the steroids as prescribed and take the meclizine as needed for dizziness.  It will make you drowsy and do not want you driving while you have the symptoms or while you are taking the meclizine.  He can return to driving and normal activities once your symptoms have completely resolved or you have been cleared by your primary care doctor.  Please avoid doing Pilates for the next week until the inflammation has resolved.

## 2025-01-09 LAB
EKG ATRIAL RATE: 60 BPM
EKG DIAGNOSIS: NORMAL
EKG P AXIS: 58 DEGREES
EKG P-R INTERVAL: 162 MS
EKG Q-T INTERVAL: 454 MS
EKG QRS DURATION: 92 MS
EKG QTC CALCULATION (BAZETT): 454 MS
EKG R AXIS: 25 DEGREES
EKG T AXIS: 90 DEGREES
EKG VENTRICULAR RATE: 60 BPM

## 2025-01-09 PROCEDURE — 93010 ELECTROCARDIOGRAM REPORT: CPT | Performed by: INTERNAL MEDICINE

## 2025-01-17 ENCOUNTER — OFFICE VISIT (OUTPATIENT)
Facility: CLINIC | Age: 65
End: 2025-01-17

## 2025-01-17 VITALS
WEIGHT: 163 LBS | HEART RATE: 74 BPM | RESPIRATION RATE: 16 BRPM | BODY MASS INDEX: 25.58 KG/M2 | DIASTOLIC BLOOD PRESSURE: 70 MMHG | OXYGEN SATURATION: 94 % | TEMPERATURE: 97.9 F | HEIGHT: 67 IN | SYSTOLIC BLOOD PRESSURE: 112 MMHG

## 2025-01-17 DIAGNOSIS — Z11.4 SCREENING FOR HIV (HUMAN IMMUNODEFICIENCY VIRUS): ICD-10-CM

## 2025-01-17 DIAGNOSIS — Z87.898 H/O VERTIGO: Primary | ICD-10-CM

## 2025-01-17 DIAGNOSIS — Z11.59 NEED FOR HEPATITIS C SCREENING TEST: ICD-10-CM

## 2025-01-17 DIAGNOSIS — Z13.220 SCREENING FOR HYPERLIPIDEMIA: ICD-10-CM

## 2025-01-17 DIAGNOSIS — Z13.1 SCREENING FOR DIABETES MELLITUS: ICD-10-CM

## 2025-01-17 DIAGNOSIS — F41.9 ANXIETY AND DEPRESSION: ICD-10-CM

## 2025-01-17 DIAGNOSIS — G47.33 OSA ON CPAP: ICD-10-CM

## 2025-01-17 DIAGNOSIS — R10.32 LEFT LOWER QUADRANT ABDOMINAL PAIN: ICD-10-CM

## 2025-01-17 DIAGNOSIS — F32.A ANXIETY AND DEPRESSION: ICD-10-CM

## 2025-01-17 RX ORDER — ESTRADIOL 0.04 MG/D
1 PATCH, EXTENDED RELEASE TRANSDERMAL
COMMUNITY

## 2025-01-17 SDOH — ECONOMIC STABILITY: FOOD INSECURITY: WITHIN THE PAST 12 MONTHS, YOU WORRIED THAT YOUR FOOD WOULD RUN OUT BEFORE YOU GOT MONEY TO BUY MORE.: NEVER TRUE

## 2025-01-17 SDOH — ECONOMIC STABILITY: FOOD INSECURITY: WITHIN THE PAST 12 MONTHS, THE FOOD YOU BOUGHT JUST DIDN'T LAST AND YOU DIDN'T HAVE MONEY TO GET MORE.: NEVER TRUE

## 2025-01-17 ASSESSMENT — PATIENT HEALTH QUESTIONNAIRE - PHQ9
SUM OF ALL RESPONSES TO PHQ QUESTIONS 1-9: 10
10. IF YOU CHECKED OFF ANY PROBLEMS, HOW DIFFICULT HAVE THESE PROBLEMS MADE IT FOR YOU TO DO YOUR WORK, TAKE CARE OF THINGS AT HOME, OR GET ALONG WITH OTHER PEOPLE: SOMEWHAT DIFFICULT
2. FEELING DOWN, DEPRESSED OR HOPELESS: MORE THAN HALF THE DAYS
4. FEELING TIRED OR HAVING LITTLE ENERGY: NOT AT ALL
5. POOR APPETITE OR OVEREATING: SEVERAL DAYS
9. THOUGHTS THAT YOU WOULD BE BETTER OFF DEAD, OR OF HURTING YOURSELF: NOT AT ALL
SUM OF ALL RESPONSES TO PHQ QUESTIONS 1-9: 10
8. MOVING OR SPEAKING SO SLOWLY THAT OTHER PEOPLE COULD HAVE NOTICED. OR THE OPPOSITE, BEING SO FIGETY OR RESTLESS THAT YOU HAVE BEEN MOVING AROUND A LOT MORE THAN USUAL: SEVERAL DAYS
1. LITTLE INTEREST OR PLEASURE IN DOING THINGS: SEVERAL DAYS
SUM OF ALL RESPONSES TO PHQ9 QUESTIONS 1 & 2: 3
SUM OF ALL RESPONSES TO PHQ QUESTIONS 1-9: 10
SUM OF ALL RESPONSES TO PHQ QUESTIONS 1-9: 10
6. FEELING BAD ABOUT YOURSELF - OR THAT YOU ARE A FAILURE OR HAVE LET YOURSELF OR YOUR FAMILY DOWN: MORE THAN HALF THE DAYS
7. TROUBLE CONCENTRATING ON THINGS, SUCH AS READING THE NEWSPAPER OR WATCHING TELEVISION: NEARLY EVERY DAY
3. TROUBLE FALLING OR STAYING ASLEEP: NOT AT ALL

## 2025-01-17 ASSESSMENT — COLUMBIA-SUICIDE SEVERITY RATING SCALE - C-SSRS
2. HAVE YOU ACTUALLY HAD ANY THOUGHTS OF KILLING YOURSELF?: NO
1. WITHIN THE PAST MONTH, HAVE YOU WISHED YOU WERE DEAD OR WISHED YOU COULD GO TO SLEEP AND NOT WAKE UP?: NO
6. HAVE YOU EVER DONE ANYTHING, STARTED TO DO ANYTHING, OR PREPARED TO DO ANYTHING TO END YOUR LIFE?: NO

## 2025-01-17 NOTE — PROGRESS NOTES
Tina Finch (:  1960) is a 64 y.o. female, New patient, here for evaluation of the following chief complaint(s):  Establish Care and Dizziness (Requesting physical therapy referral)         Assessment & Plan  1. Vertigo.  She has been experiencing persistent vertigo despite previous treatment with meclizine. A referral to an ENT specialist will be initiated for further evaluation and to potentially order vestibular therapy. She has been performing vertigo exercises at home and will continue to do so. If the vestibular therapy does not help, further ENT involvement will be considered.    2. Depression and anxiety.  She is currently on Zoloft and reports improvement in her symptoms. She is not taking Lexapro. She takes Xanax at night to help with sleep. She also takes gabapentin, prescribed many years ago for depression, and it has been effective. She reports no thoughts of self-harm or harm to others. She will continue her current medication regimen and follow up with her psychiatrist, Dr. Edgar Gupta, as needed.    3. Chronic abdominal pain.  She has a history of chronic abdominal pain, which has been present since childhood and recently flared up. A CAT scan in  showed mild acute subacute enterocolitis and diverticulosis, but no diverticulitis. She reports no current diarrhea or blood in the stool. A repeat CAT scan will be ordered to rule out any infection. She is advised to follow up with a gastroenterologist for a colonoscopy and further evaluation of her abdominal pain.    4. Health maintenance.  Her recent lab work done at the ER showed troponin, blood count, liver and kidney functions, and electrolytes within normal limits. Her magnesium level was 2.2. Her mammogram done in 2024 was negative. Screening tests for HIV, hepatitis C, cholesterol, and diabetes will be ordered. She is advised to bring her recent blood work results to the next visit.    Results  Laboratory

## 2025-01-20 ENCOUNTER — HOSPITAL ENCOUNTER (OUTPATIENT)
Facility: HOSPITAL | Age: 65
Discharge: HOME | End: 2025-01-23
Attending: FAMILY MEDICINE
Payer: COMMERCIAL

## 2025-01-20 DIAGNOSIS — R10.32 LEFT LOWER QUADRANT ABDOMINAL PAIN: ICD-10-CM

## 2025-01-20 DIAGNOSIS — K57.92 DIVERTICULITIS: Primary | ICD-10-CM

## 2025-01-20 PROCEDURE — 6360000004 HC RX CONTRAST MEDICATION: Performed by: FAMILY MEDICINE

## 2025-01-20 PROCEDURE — 2500000003 HC RX 250 WO HCPCS: Performed by: FAMILY MEDICINE

## 2025-01-20 PROCEDURE — 74177 CT ABD & PELVIS W/CONTRAST: CPT

## 2025-01-20 RX ORDER — IOPAMIDOL 612 MG/ML
100 INJECTION, SOLUTION INTRAVASCULAR
Status: COMPLETED | OUTPATIENT
Start: 2025-01-20 | End: 2025-01-20

## 2025-01-20 RX ORDER — CIPROFLOXACIN 500 MG/1
500 TABLET, FILM COATED ORAL 2 TIMES DAILY
Qty: 14 TABLET | Refills: 0 | Status: SHIPPED | OUTPATIENT
Start: 2025-01-20 | End: 2025-01-27

## 2025-01-20 RX ORDER — FLUCONAZOLE 150 MG/1
150 TABLET ORAL DAILY
Qty: 1 TABLET | Refills: 1 | Status: SHIPPED | OUTPATIENT
Start: 2025-01-20

## 2025-01-20 RX ORDER — METRONIDAZOLE 500 MG/1
500 TABLET ORAL 2 TIMES DAILY
Qty: 14 TABLET | Refills: 0 | Status: SHIPPED | OUTPATIENT
Start: 2025-01-20 | End: 2025-01-27

## 2025-01-20 RX ADMIN — BARIUM SULFATE 900 ML: 20 SUSPENSION ORAL at 13:23

## 2025-01-20 RX ADMIN — IOPAMIDOL 100 ML: 612 INJECTION, SOLUTION INTRAVENOUS at 15:04

## 2025-01-27 ENCOUNTER — HOSPITAL ENCOUNTER (OUTPATIENT)
Facility: HOSPITAL | Age: 65
Setting detail: RECURRING SERIES
Discharge: HOME | End: 2025-01-30
Payer: COMMERCIAL

## 2025-01-27 PROCEDURE — 95992 CANALITH REPOSITIONING PROC: CPT

## 2025-01-27 PROCEDURE — 97535 SELF CARE MNGMENT TRAINING: CPT

## 2025-01-27 PROCEDURE — 97161 PT EVAL LOW COMPLEX 20 MIN: CPT

## 2025-01-27 NOTE — PROGRESS NOTES
PHYSICAL / OCCUPATIONAL THERAPY - DAILY TREATMENT NOTE (updated )  For Eval visit    Patient Name: Tina Finch    Date: 2025    : 1960  Insurance: Payor: LAUREL JAIME / Plan: JAMI BCJESSICA [place]ERAL / Product Type: *No Product type* /      Patient  verified yes     Visit #   Current / Total 1 4   Time   In / Out 1:33 pm 2:05 pm   Pain   In / Out 0 0   Subjective Functional Status/Changes: See POC     TREATMENT AREA =  see POC    OBJECTIVE      19 min   Eval - untimed                      Therapeutic Procedures:    Tx Min Billable or 1:1 Min (if diff from Tx Min) Procedure, Rationale, Specifics         5  10584 Canalith Repositioning (un-timed):  correct positional vertigo, decrease dizziness, improve balance to improve patient's ability to progress to PLOF and address remaining functional goals.     Details if applicable:             8  36661 Self Care/Home Management (timed):  improve patient knowledge and understanding of home injury/symptom/pain management and activity modification  to improve patient's ability to progress to PLOF and address remaining functional goals.  (see flow sheet as applicable)     Details if applicable:            Details if applicable:            Details if applicable:     13  [facility] Totals Reminder: bill using total billable min of TIMED therapeutic procedures (example: do not include dry needle or estim unattended, both untimed codes, in totals to left)  8-22 min = 1 unit; 23-37 min = 2 units; 38-52 min = 3 units; 53-67 min = 4 units; 68-82 min = 5 units   Total Total     [x]  Patient Education billed concurrently with other procedures   [x] Review HEP    [] Progressed/Changed HEP, detail:    [] Other detail:       Objective Information/Functional Measures/Assessment    See POC    Patient will continue to benefit from skilled PT / OT services to modify and progress therapeutic interventions, analyze and address functional mobility deficits, analyze and cue for

## 2025-01-27 NOTE — THERAPY EVALUATION
JEN ALEJANDRE [facility]  [address], [address], [place] [address]  Ph:[phone] Fx:[phone]  Plan of Care / Statement of Necessity for Physical Therapy Services     Patient Name: Tina Finch : 1960   Medical   Diagnosis: Vertigo [R42]  Treatment Diagnosis:  H81.11  Benign paroxysmal vertigo, right ear and R42   Dizziness and giddiness   Onset Date: 2025     Referral Source: Nivia Juárez MD Start of Care (SOC): 2025   Prior Hospitalization: See medical history Provider #: [id number]   Prior Level of Function: Independent with ADLs and IADLs, exercises frequently    Comorbidities:  Social determinants of health: Depression     Not post operative    Evaluation Complexity:  History:  MEDIUM  Complexity : 1-2 comorbidities / personal factors will impact the outcome/ POC ; Examination:  MEDIUM Complexity : 3 Standardized tests and measures addressin body structure, function, activity limitation and / or participation in recreation  ;Presentation:  LOW Complexity : Stable, uncomplicated  ;Clinical Decision Making: Balance Tests DHI  12;  0-30 Points (mild handicap): LOW Complexity  Overall Complexity Rating: LOW   Problem List: impaired gait/balance, decrease activity tolerance, and decrease transfer abilities    Treatment Plan may include any combination of the followin Therapeutic Exercise, 14645 Neuromuscular Re-Education, 22397 Manual Therapy, 95791 Therapeutic Activity, 62054 Self Care/Home Management, and 31671 Canalith Repositioning  Patient / Family readiness to learn indicated by: asking questions, trying to perform skills, interest, and return verbalization   Persons(s) to be included in education: patient (P)  Barriers to Learning/Limitations: none  Measures taken if barriers to learning present: N/A  Patient Goal (s): \"I want to get rid of this dizziness so I can workout and function normally.\"  Patient Self Reported Health

## 2025-01-29 ENCOUNTER — TELEPHONE (OUTPATIENT)
Facility: HOSPITAL | Age: 65
End: 2025-01-29

## 2025-01-29 NOTE — TELEPHONE ENCOUNTER
Patient cancelled appt. on 1/29/25 at 10:22am due to the patient having no symptoms & she is not able to make her appt.

## 2025-02-03 ENCOUNTER — OFFICE VISIT (OUTPATIENT)
Facility: CLINIC | Age: 65
End: 2025-02-03
Payer: COMMERCIAL

## 2025-02-03 ENCOUNTER — TELEPHONE (OUTPATIENT)
Facility: CLINIC | Age: 65
End: 2025-02-03

## 2025-02-03 VITALS
TEMPERATURE: 97.8 F | DIASTOLIC BLOOD PRESSURE: 82 MMHG | HEIGHT: 67 IN | RESPIRATION RATE: 16 BRPM | BODY MASS INDEX: 25.53 KG/M2 | HEART RATE: 76 BPM | OXYGEN SATURATION: 96 % | SYSTOLIC BLOOD PRESSURE: 102 MMHG

## 2025-02-03 DIAGNOSIS — R42 VERTIGO: ICD-10-CM

## 2025-02-03 DIAGNOSIS — R10.32 LEFT LOWER QUADRANT ABDOMINAL PAIN: Primary | ICD-10-CM

## 2025-02-03 DIAGNOSIS — K57.92 DIVERTICULITIS: ICD-10-CM

## 2025-02-03 DIAGNOSIS — E27.9 ADRENAL NODULE (HCC): ICD-10-CM

## 2025-02-03 PROCEDURE — 99213 OFFICE O/P EST LOW 20 MIN: CPT | Performed by: FAMILY MEDICINE

## 2025-02-03 RX ORDER — AZELASTINE HYDROCHLORIDE 137 UG/1
SPRAY, METERED NASAL
COMMUNITY
Start: 2025-01-23

## 2025-02-03 RX ORDER — OLANZAPINE 2.5 MG/1
TABLET, FILM COATED ORAL
COMMUNITY
Start: 2025-01-14

## 2025-02-03 NOTE — PROGRESS NOTES
\"Have you been to the ER, urgent care clinic since your last visit?  Hospitalized since your last visit?\"    NO    “Have you seen or consulted any other health care providers outside our system since your last visit?”    NO      “Have you had a colorectal cancer screening such as a colonoscopy/FIT/Cologuard?    [facility] 2016 - 10 year recall per patient.    Chief Complaint   Patient presents with    Abdominal Pain     LLQ ABD pain follow-up    Results         No colonoscopy on file  No cologuard on file  No FIT/FOBT on file   No flexible sigmoidoscopy on file

## 2025-02-03 NOTE — TELEPHONE ENCOUNTER
Patient called and said that she given Nurse Marta wrong information she goes to [facility] in [place]. Phone [phone].

## 2025-02-03 NOTE — PROGRESS NOTES
Tina Finch (:  1960) is a 64 y.o. female, Established patient, here for evaluation of the following chief complaint(s):  Abdominal Pain (LLQ ABD pain follow-up) and Results         Assessment & Plan  1. Diverticulitis.  The patient's left lower abdominal pain has resolved, likely due to the treatment of diverticulitis. She reports no side effects from the antibiotics and no current pain. A referral to a gastroenterologist will be made for further evaluation. She is due for a colonoscopy next year, but earlier scheduling may be considered based on insurance constraints. The gastroenterologist's office will be contacted to confirm the timing of the next colonoscopy.    2. Vertigo.  The patient continues to experience vertigo, particularly when lying flat and then getting up. She has seen a physical therapist and was referred to a motion specialist. A referral to a neurologist will be made for further evaluation and management of her vertigo.    3. Left adrenal mass.  A 3.3 cm left adrenal mass was noted on the CAT scan, stable since . No immediate intervention is required, but regular monitoring with yearly imaging studies is recommended to track any changes.    Follow-up  The patient will follow up on 2025 for a physical examination and any necessary follow-up care.    Results  Imaging  CAT scan showed diverticulitis and a 3.3 cm left adrenal mass and a small nodule, both stable since .  1. Left lower quadrant abdominal pain  2. Diverticulitis  Comments:  got an appt for colonoscopy  3. Adrenal nodule (HCC)  Comments:  3.3 cm  4. Vertigo  Comments:  under therapy .  Orders:  -     [facility] - Sangita Martin MD, Neurology, [place] ([facility])    Return in about 1 month (around 3/3/2025) for physical + follow up .       Subjective   History of Present Illness  The patient presents for evaluation of diverticulitis, vertigo, and left adrenal mass.    She reports an improvement in

## 2025-02-10 ENCOUNTER — TELEPHONE (OUTPATIENT)
Facility: HOSPITAL | Age: 65
End: 2025-02-10

## 2025-02-10 NOTE — TELEPHONE ENCOUNTER
Spoke to the patient on 2/10/25 at 3:32pm and she said she hasn't had an episode, I told her she will be hold until Thursday, 2/27/25. DC on 2/27/25 if we have not heard from the patient.

## 2025-02-13 ENCOUNTER — OFFICE VISIT (OUTPATIENT)
Facility: CLINIC | Age: 65
End: 2025-02-13
Payer: COMMERCIAL

## 2025-02-13 VITALS
BODY MASS INDEX: 25.87 KG/M2 | RESPIRATION RATE: 16 BRPM | DIASTOLIC BLOOD PRESSURE: 68 MMHG | WEIGHT: 164.8 LBS | HEIGHT: 67 IN | TEMPERATURE: 97 F | OXYGEN SATURATION: 100 % | SYSTOLIC BLOOD PRESSURE: 116 MMHG | HEART RATE: 76 BPM

## 2025-02-13 DIAGNOSIS — Z00.00 ENCOUNTER FOR WELL ADULT EXAM WITHOUT ABNORMAL FINDINGS: Primary | ICD-10-CM

## 2025-02-13 DIAGNOSIS — R10.9 CHRONIC ABDOMINAL PAIN: ICD-10-CM

## 2025-02-13 DIAGNOSIS — G89.29 CHRONIC ABDOMINAL PAIN: ICD-10-CM

## 2025-02-13 PROCEDURE — 99396 PREV VISIT EST AGE 40-64: CPT | Performed by: FAMILY MEDICINE

## 2025-02-13 NOTE — PROGRESS NOTES
Well Adult Note  Name: Tina Finch Today’s Date: 2025   MRN: [id number] Sex: Female   Age: 64 y.o. Ethnicity: Non- / Non    : 1960 Race: White (non-)      Tina Finch is here for a well adult exam.          Assessment & Plan  1. Chronic abdominal pain.  She reports ongoing abdominal pain since age 13, exacerbated by food intake and prolonged sitting. She is currently under the care of a gastroenterologist and is taking Linzess, which provides some relief. She is also taking probiotics daily.    2. Diverticulitis.  She had a recent episode of diverticulitis that improved with antibiotics.    3. Health maintenance.  She is up to date on her preventive care, including a mammogram in 2024. She performs self-breast exams and is sexually active with one partner without using protection. She has no history of sexually transmitted diseases or abnormal Pap smears. She had a hysterectomy due to non-cancerous reasons. She is advised to complete her colonoscopy next year. Routine blood work has been ordered for screening purposes, which she needs to complete before her next visit in 3 months.    Follow-up  The patient will follow up in 3 months.    PROCEDURE  The patient underwent a hysterectomy due to persistent bleeding.    Encounter for well adult exam without abnormal findings  Results         No follow-ups on file.     Subjective   History of Present Illness  The patient presents for a physical exam.    She has been experiencing abdominal discomfort since the age of 13, characterized by cramping after eating or prolonged sitting. She is under the care of a gastroenterologist at [facility] and has been prescribed Linzess, which provides some relief. She is able to distinguish between her chronic abdominal pain and episodes of diverticulitis. Her bowel movements are regular, and she reports no recent changes in weight or appetite. She takes a daily probiotic and is

## 2025-02-13 NOTE — PATIENT INSTRUCTIONS
hands, brush your teeth twice a day, and wear a seat belt in the car.   Where can you learn more?  Go to [email or web address]/patientEd and enter P072 to learn more about \"Well Visit, Ages 18 to 65: Care Instructions.\"  Current as of: April 30, 2024  Content Version: 14.3  © 2024 [employer].   Care instructions adapted under license by [employer]. If you have questions about a medical condition or this instruction, always ask your healthcare professional. [employer] disclaims any warranty or liability for your use of this information.

## 2025-02-13 NOTE — PROGRESS NOTES
\"Have you been to the ER, urgent care clinic since your last visit?  Hospitalized since your last visit?\"    NO    “Have you seen or consulted any other health care providers outside our system since your last visit?”    ENT, Dr. Anirudh BOWMAN: 1/20/25.      “Have you had a colorectal cancer screening such as a colonoscopy/FIT/Cologuard?    [facility] 2016 - 10 year recall per patent    No colonoscopy on file  No cologuard on file  No FIT/FOBT on file   No flexible sigmoidoscopy on file     Last pap smear - hysterectomy - sees [facility] and her next appointment with them is scheduled for 3/2025.    Last mammogram - 10/16/24    Chief Complaint   Patient presents with    Annual Exam

## 2025-02-18 NOTE — TELEPHONE ENCOUNTER
Noted. I called [facility] and spoke with Fanny in the medical records department to request the most recent colonoscopy report for patient. Fanny verbalized understanding; stated she will fax report to hospitals at [phone]. Fanny advised last colonoscopy was in 2016 and patient has a follow-up appointment scheduled for 5/23/2025 at their office.

## 2025-02-24 ENCOUNTER — TELEPHONE (OUTPATIENT)
Facility: CLINIC | Age: 65
End: 2025-02-24

## 2025-02-24 NOTE — TELEPHONE ENCOUNTER
Pt would like to get her hormone and estrogen levels checked with her labs she is doing for her 3/12/2025 appt. Please advise when ordered.

## 2025-02-26 ENCOUNTER — TRANSCRIBE ORDERS (OUTPATIENT)
Facility: HOSPITAL | Age: 65
End: 2025-02-26

## 2025-02-26 DIAGNOSIS — M81.0 OSTEOPOROSIS, UNSPECIFIED OSTEOPOROSIS TYPE, UNSPECIFIED PATHOLOGICAL FRACTURE PRESENCE: Primary | ICD-10-CM

## 2025-03-21 ENCOUNTER — TELEPHONE (OUTPATIENT)
Facility: CLINIC | Age: 65
End: 2025-03-21

## 2025-03-21 RX ORDER — CIPROFLOXACIN 500 MG/1
500 TABLET, FILM COATED ORAL 2 TIMES DAILY
Qty: 14 TABLET | Refills: 0 | Status: SHIPPED | OUTPATIENT
Start: 2025-03-21 | End: 2025-03-28

## 2025-03-21 RX ORDER — METRONIDAZOLE 500 MG/1
500 TABLET ORAL 2 TIMES DAILY
Qty: 14 TABLET | Refills: 0 | Status: SHIPPED | OUTPATIENT
Start: 2025-03-21 | End: 2025-03-28

## 2025-03-21 NOTE — TELEPHONE ENCOUNTER
Diverticulitis flare up started about 3 days ago. Is unsure what to do. It had fully resolved when Dr. Juárez gave her antibiotics back in February. She feels like she felt at that time. Please advise.

## 2025-03-21 NOTE — TELEPHONE ENCOUNTER
Spoke with patient (two patient identifiers verified) to advise Dr. Juárez sent Cipro and Flagyl to [facility] on [address]. Patient verbalized understanding. During call, patient scheduled 3 month f/u appointment with Dr. Juárez on 5/13/25 at 11:00 AM. She declined to schedule sooner appointment for diverticulitis follow-up; stated this is a common occurrence  for her and is used to it. Patient was instructed to return call to [facility] if she decides to have sooner appointment than May for diverticulitis follow-up. She verbalized understanding.

## 2025-03-24 ENCOUNTER — TELEPHONE (OUTPATIENT)
Facility: CLINIC | Age: 65
End: 2025-03-24

## 2025-03-24 DIAGNOSIS — K57.92 DIVERTICULITIS: Primary | ICD-10-CM

## 2025-03-24 RX ORDER — FLUCONAZOLE 150 MG/1
150 TABLET ORAL DAILY
Qty: 1 TABLET | Refills: 1 | Status: SHIPPED | OUTPATIENT
Start: 2025-03-24

## 2025-03-24 NOTE — TELEPHONE ENCOUNTER
Patient identified with 2 identifiers (name and ).  Patient aware Diflucan has been e scribed to [facility]

## 2025-03-24 NOTE — TELEPHONE ENCOUNTER
Pt states that she usually gets a yeast infection with the medication prescribed on 3/21/2025 and she would like to know if Dr Juárez would call in   fluconazole (DIFLUCAN) 150 MG tablet  to [facility] on [address]. Please advise.

## 2025-04-03 ENCOUNTER — HOSPITAL ENCOUNTER (EMERGENCY)
Facility: HOSPITAL | Age: 65
Discharge: HOME | End: 2025-04-03
Payer: COMMERCIAL

## 2025-04-03 VITALS
HEART RATE: 82 BPM | TEMPERATURE: 98.3 F | OXYGEN SATURATION: 99 % | WEIGHT: 163 LBS | RESPIRATION RATE: 16 BRPM | BODY MASS INDEX: 25.58 KG/M2 | SYSTOLIC BLOOD PRESSURE: 124 MMHG | HEIGHT: 67 IN | DIASTOLIC BLOOD PRESSURE: 78 MMHG

## 2025-04-03 DIAGNOSIS — M62.830 LUMBAR PARASPINAL MUSCLE SPASM: Primary | ICD-10-CM

## 2025-04-03 PROCEDURE — 99282 EMERGENCY DEPT VISIT SF MDM: CPT

## 2025-04-03 ASSESSMENT — PAIN SCALES - GENERAL: PAINLEVEL_OUTOF10: 3

## 2025-04-03 ASSESSMENT — PAIN - FUNCTIONAL ASSESSMENT: PAIN_FUNCTIONAL_ASSESSMENT: 0-10

## 2025-04-03 NOTE — ED TRIAGE NOTES
Ambulatory to 9 with c/o right lower back and buttocks pain x 2 days, Hx of sciatica. Denies injury, denies urinary sx's and declined UA at triage.

## 2025-04-04 ASSESSMENT — ENCOUNTER SYMPTOMS
VOMITING: 0
ABDOMINAL PAIN: 0
NAUSEA: 0
DIARRHEA: 0
SHORTNESS OF BREATH: 0

## 2025-04-04 NOTE — ED PROVIDER NOTES
EMERGENCY DEPARTMENT HISTORY AND PHYSICAL EXAM        Date: 4/3/2025  Patient Name: Tina Finch    History of Presenting Illness     Chief Complaint   Patient presents with    Back Pain       History Provided By: History obtained from patient    HPI: Tina Finch, 64 y.o. female presents to the ED with cc of back pain episodes the last 2 mornings    Patient is in the emergency department today because she is \"scared about tomorrow morning\".  The last 2 mornings she is had back pain when waking up that was of such severity that she lowered herself to the floor until pain spontaneously resolved/improved and she was able to go about her day.  She said that she is not experiencing pain right now she is just worried about the pain coming on tomorrow morning.  She does not want any medications.  She describes the pain as a burning pain radiated in the right low back and radiating into the superior gluteus.  Denies any numbness tingling in the groin area no bowel or bladder incontinence or retention, no lower extremity paresthesia, no history of trauma, no history of neurologic disease or spine surgery.    No nausea, vomiting, diarrhea, fever, chills, chest pain, shortness of breath, leg swelling     There are no other complaints, changes, or physical findings at this time.    Records Reviewed: na    PCP: Nivia Juárez MD    No current facility-administered medications on file prior to encounter.     Current Outpatient Medications on File Prior to Encounter   Medication Sig Dispense Refill    fluconazole (DIFLUCAN) 150 MG tablet Take 1 tablet by mouth daily 1 tablet 1    azelastine (ASTEPRO) 137 MCG/SPRAY nasal spray       OLANZapine (ZYPREXA) 2.5 MG tablet       estradiol (VIVELLE) 0.0375 MG/24HR Place 1 patch onto the skin Twice a Week      amoxicillin-clavulanate (AUGMENTIN) 875-125 MG per tablet Take 1 tablet by mouth 2 times daily (Patient not taking: Reported on 2/13/2025)      sertraline (ZOLOFT) 50

## 2025-04-28 ENCOUNTER — HOSPITAL ENCOUNTER (OUTPATIENT)
Facility: HOSPITAL | Age: 65
Discharge: HOME | End: 2025-05-01
Payer: COMMERCIAL

## 2025-04-28 DIAGNOSIS — Z11.59 NEED FOR HEPATITIS C SCREENING TEST: ICD-10-CM

## 2025-04-28 DIAGNOSIS — Z11.4 SCREENING FOR HIV (HUMAN IMMUNODEFICIENCY VIRUS): ICD-10-CM

## 2025-04-28 DIAGNOSIS — Z13.220 SCREENING FOR HYPERLIPIDEMIA: ICD-10-CM

## 2025-04-28 DIAGNOSIS — Z13.1 SCREENING FOR DIABETES MELLITUS: ICD-10-CM

## 2025-04-28 LAB
CHOLEST SERPL-MCNC: 214 MG/DL
EST. AVERAGE GLUCOSE BLD GHB EST-MCNC: 105 MG/DL
HBA1C MFR BLD: 5.3 % (ref 4.2–5.6)
HDLC SERPL-MCNC: 57 MG/DL (ref 40–60)
HDLC SERPL: 3.8 (ref 0–5)
LDLC SERPL CALC-MCNC: 143 MG/DL (ref 0–100)
LIPID PANEL: ABNORMAL
TRIGL SERPL-MCNC: 70 MG/DL
VLDLC SERPL CALC-MCNC: 14 MG/DL

## 2025-04-28 PROCEDURE — 86803 HEPATITIS C AB TEST: CPT

## 2025-04-28 PROCEDURE — 80061 LIPID PANEL: CPT

## 2025-04-28 PROCEDURE — 83036 HEMOGLOBIN GLYCOSYLATED A1C: CPT

## 2025-04-28 PROCEDURE — 36415 COLL VENOUS BLD VENIPUNCTURE: CPT

## 2025-04-28 PROCEDURE — 87389 HIV-1 AG W/HIV-1&-2 AB AG IA: CPT

## 2025-04-29 LAB
HIV 1+2 AB+HIV1 P24 AG SERPL QL IA: NONREACTIVE
HIV 1/2 RESULT COMMENT: NORMAL

## 2025-04-30 ENCOUNTER — RESULTS FOLLOW-UP (OUTPATIENT)
Facility: CLINIC | Age: 65
End: 2025-04-30

## 2025-04-30 LAB — HCV AB SER QL: NONREACTIVE

## 2025-05-13 ENCOUNTER — OFFICE VISIT (OUTPATIENT)
Facility: CLINIC | Age: 65
End: 2025-05-13
Payer: COMMERCIAL

## 2025-05-13 VITALS
OXYGEN SATURATION: 99 % | WEIGHT: 166 LBS | RESPIRATION RATE: 16 BRPM | HEIGHT: 67 IN | DIASTOLIC BLOOD PRESSURE: 70 MMHG | SYSTOLIC BLOOD PRESSURE: 110 MMHG | BODY MASS INDEX: 26.06 KG/M2 | TEMPERATURE: 97.1 F | HEART RATE: 76 BPM

## 2025-05-13 DIAGNOSIS — Z87.891 FORMER SMOKER: ICD-10-CM

## 2025-05-13 DIAGNOSIS — Z87.19 H/O DIVERTICULITIS OF COLON: ICD-10-CM

## 2025-05-13 DIAGNOSIS — F32.89 OTHER DEPRESSION: ICD-10-CM

## 2025-05-13 DIAGNOSIS — N32.81 OVERACTIVE BLADDER: ICD-10-CM

## 2025-05-13 DIAGNOSIS — I71.21 ANEURYSM OF ASCENDING AORTA WITHOUT RUPTURE: ICD-10-CM

## 2025-05-13 DIAGNOSIS — E78.5 DYSLIPIDEMIA: ICD-10-CM

## 2025-05-13 DIAGNOSIS — M54.31 SCIATICA OF RIGHT SIDE: ICD-10-CM

## 2025-05-13 DIAGNOSIS — G47.33 OSA ON CPAP: ICD-10-CM

## 2025-05-13 DIAGNOSIS — G43.909 MIGRAINE WITHOUT STATUS MIGRAINOSUS, NOT INTRACTABLE, UNSPECIFIED MIGRAINE TYPE: ICD-10-CM

## 2025-05-13 DIAGNOSIS — Z90.710 H/O HYSTERECTOMY FOR BENIGN DISEASE: ICD-10-CM

## 2025-05-13 DIAGNOSIS — E27.8 ADRENAL MASS, LEFT: Primary | ICD-10-CM

## 2025-05-13 DIAGNOSIS — E55.9 VITAMIN D DEFICIENCY: ICD-10-CM

## 2025-05-13 PROBLEM — H10.31 ACUTE CONJUNCTIVITIS OF RIGHT EYE: Status: RESOLVED | Noted: 2023-09-20 | Resolved: 2025-05-13

## 2025-05-13 PROCEDURE — 99214 OFFICE O/P EST MOD 30 MIN: CPT | Performed by: FAMILY MEDICINE

## 2025-05-13 NOTE — PROGRESS NOTES
Have you been to the ER, urgent care clinic since your last visit?  Hospitalized since your last visit?   [facility] LOV: 4/03/25.    Have you seen or consulted any other health care providers outside our system since your last visit?   NO      “Have you had a colorectal cancer screening such as a colonoscopy/FIT/Cologuard?    NO    No colonoscopy on file  No cologuard on file  No FIT/FOBT on file   No flexible sigmoidoscopy on file     Chief Complaint   Patient presents with    Abdominal Pain     Chronic abdominal pain follow-up            
sessions.    She has been using a CPAP machine for her sleep apnea.    Her migraine headaches have been well-controlled since her hysterectomy.    She has been compliant with her vitamin D supplementation.    Her depression is currently stable, although she reports a shift in her symptoms from lethargy to hyperactivity. She is under the care of a psychiatrist and is on a regimen of Zoloft, olanzapine, and alprazolam as needed. She also takes gabapentin prescribed by Dr. Hernández.    Her overactive bladder is currently stable.    She has an adrenal mass that has remained stable since 2023. She has been advised to monitor it annually.    She has a history of high cholesterol and recent blood work showed elevated bad cholesterol. She has been working on her diet and lifestyle.    She has a slightly dilated ascending aorta and is under the care of a cardiologist, Dr. Garza, whom she last saw in July 2024. She reports no chest pain, trouble breathing, or palpitations.    She has not undergone lung cancer screening via CT scan. She has a history of smoking for 15 years during her teenage years but did not inhale much. She has not consulted a thyroid specialist and reports no thyroid-related pain.    PAST SURGICAL HISTORY:  - Hysterectomy  -     SOCIAL HISTORY  The patient does not smoke or drink alcohol currently but smoked for about 15 years as a teenager.    Review of Systems       Objective   Blood pressure 110/70, pulse 76, temperature 97.1 °F (36.2 °C), temperature source Temporal, resp. rate 16, height 1.702 m (5' 7\"), weight 75.3 kg (166 lb), SpO2 99%.  Physical Exam  General: Sitting comfortably without any acute distress.  Neurological: Awake, alert, oriented x4, no focal deficit  Gastrointestinal: Abdomen nontender.  Extremities: No edema.  Musculoskeletal: No point tenderness over the lumbar spine. Right side paraspinal muscle discomfort present over the lower lumbar spine and over the buttock area.

## 2025-05-28 DIAGNOSIS — Z78.0 POSTMENOPAUSAL: Primary | ICD-10-CM

## 2025-06-27 ENCOUNTER — TELEPHONE (OUTPATIENT)
Facility: CLINIC | Age: 65
End: 2025-06-27

## 2025-07-03 ENCOUNTER — HOSPITAL ENCOUNTER (OUTPATIENT)
Facility: HOSPITAL | Age: 65
Discharge: HOME | End: 2025-07-03
Attending: FAMILY MEDICINE
Payer: MEDICARE

## 2025-07-03 DIAGNOSIS — Z78.0 POSTMENOPAUSAL: ICD-10-CM

## 2025-07-03 PROCEDURE — 77080 DXA BONE DENSITY AXIAL: CPT

## 2025-07-07 ENCOUNTER — RESULTS FOLLOW-UP (OUTPATIENT)
Facility: CLINIC | Age: 65
End: 2025-07-07

## 2025-07-18 ENCOUNTER — OFFICE VISIT (OUTPATIENT)
Age: 65
End: 2025-07-18
Payer: COMMERCIAL

## 2025-07-18 VITALS
WEIGHT: 171 LBS | SYSTOLIC BLOOD PRESSURE: 94 MMHG | HEART RATE: 84 BPM | HEIGHT: 67 IN | BODY MASS INDEX: 26.84 KG/M2 | DIASTOLIC BLOOD PRESSURE: 60 MMHG

## 2025-07-18 DIAGNOSIS — I71.21 ANEURYSM OF ASCENDING AORTA WITHOUT RUPTURE: Primary | ICD-10-CM

## 2025-07-18 DIAGNOSIS — I95.9 HYPOTENSION, UNSPECIFIED HYPOTENSION TYPE: ICD-10-CM

## 2025-07-18 DIAGNOSIS — R94.31 ABNORMAL EKG: ICD-10-CM

## 2025-07-18 DIAGNOSIS — G47.33 OSA ON CPAP: ICD-10-CM

## 2025-07-18 PROCEDURE — 93000 ELECTROCARDIOGRAM COMPLETE: CPT | Performed by: INTERNAL MEDICINE

## 2025-07-18 PROCEDURE — 1123F ACP DISCUSS/DSCN MKR DOCD: CPT | Performed by: INTERNAL MEDICINE

## 2025-07-18 PROCEDURE — 99214 OFFICE O/P EST MOD 30 MIN: CPT | Performed by: INTERNAL MEDICINE

## 2025-07-18 ASSESSMENT — PATIENT HEALTH QUESTIONNAIRE - PHQ9
SUM OF ALL RESPONSES TO PHQ QUESTIONS 1-9: 0
7. TROUBLE CONCENTRATING ON THINGS, SUCH AS READING THE NEWSPAPER OR WATCHING TELEVISION: NOT AT ALL
3. TROUBLE FALLING OR STAYING ASLEEP: NOT AT ALL
1. LITTLE INTEREST OR PLEASURE IN DOING THINGS: NOT AT ALL
2. FEELING DOWN, DEPRESSED OR HOPELESS: NOT AT ALL
9. THOUGHTS THAT YOU WOULD BE BETTER OFF DEAD, OR OF HURTING YOURSELF: NOT AT ALL
8. MOVING OR SPEAKING SO SLOWLY THAT OTHER PEOPLE COULD HAVE NOTICED. OR THE OPPOSITE, BEING SO FIGETY OR RESTLESS THAT YOU HAVE BEEN MOVING AROUND A LOT MORE THAN USUAL: NOT AT ALL
6. FEELING BAD ABOUT YOURSELF - OR THAT YOU ARE A FAILURE OR HAVE LET YOURSELF OR YOUR FAMILY DOWN: NOT AT ALL
SUM OF ALL RESPONSES TO PHQ QUESTIONS 1-9: 0
SUM OF ALL RESPONSES TO PHQ QUESTIONS 1-9: 0
10. IF YOU CHECKED OFF ANY PROBLEMS, HOW DIFFICULT HAVE THESE PROBLEMS MADE IT FOR YOU TO DO YOUR WORK, TAKE CARE OF THINGS AT HOME, OR GET ALONG WITH OTHER PEOPLE: NOT DIFFICULT AT ALL
5. POOR APPETITE OR OVEREATING: NOT AT ALL
4. FEELING TIRED OR HAVING LITTLE ENERGY: NOT AT ALL
SUM OF ALL RESPONSES TO PHQ QUESTIONS 1-9: 0

## 2025-07-18 ASSESSMENT — ENCOUNTER SYMPTOMS
VOMITING: 0
NAUSEA: 0
CHEST TIGHTNESS: 0
SHORTNESS OF BREATH: 0
ABDOMINAL DISTENTION: 0
COUGH: 0
SORE THROAT: 0
ABDOMINAL PAIN: 0

## 2025-07-18 NOTE — PROGRESS NOTES
07/18/25     Tina Finch  is a 65 y.o. female     Chief Complaint   Patient presents with    Follow-up     1 year       HPI    Patient presents for a follow-up office visit.  She was initially referred here by her PCP for evaluation of chest pain.  She does not have a prior cardiac history.  She underwent a coronary calcium score in 2021 which was 0.  She also underwent a CT lung cancer screening of her thorax in May 2022 which showed an incidental finding of an enlarged a sending thoracic aorta measuring between 4.1 and 4.3 cm.  She underwent an echocardiogram at the end of April 2023 to evaluate chest pain.  This demonstrated preserved LV function, EF 60%, no regional wall motion abnormality.  Mild to moderate tricuspid regurgitation with normal PA pressures.  She also was found to have a moderately dilated aortic root.    She was seen in the emergency room at [facility] the day after her echocardiogram where she was found to have an abnormal EKG with anteroseptal Q waves, but no acute ST-T abnormalities.  Her troponins were negative.  She was discharged home with recommendation for an outpatient cardiac work-up.    Patient underwent a pharmacologic nuclear stress test in August 2023 which is a normal low risk study.  No evidence of ischemia or infarction, EF 67%.  She also underwent a CTA of her abdomen and thorax in August 2023 which showed a stable sized ascending aortic aneurysm measuring 4.4 cm in maximal diameter.  There is no evidence of dissection.  Her PCP ordered a follow-up CT of her thorax in April 2024 which was unchanged.      The patient was last seen in our office 1 year ago.  Since last visit, she states has been feeling well.  She denies any major change in her activity tolerance.  She exercises multiple days a week without shortness of breath, chest pain, dizziness or syncope.    Past Medical History:   Diagnosis Date    Anxiety     Migraine     Migraine 04/22/2008

## 2025-07-18 NOTE — PROGRESS NOTES
Tina Finch presents today for   Chief Complaint   Patient presents with    Follow-up     1 year       Tina Finch preferred language for health care discussion is english/other.    Is someone accompanying this pt? no    Is the patient using any DME equipment during OV? no    Depression Screening:  Depression: Not at risk (7/18/2025)    PHQ-2     PHQ-2 Score: 0        Learning Assessment:  Who is the primary learner? Patient    What is the preferred language for health care of the primary learner? ENGLISH    How does the primary learner prefer to learn new concepts? DEMONSTRATION    Answered By patient    Relationship to Learner SELF           Pt currently taking Anticoagulant therapy? no    Pt currently taking Antiplatelet therapy ? no      Coordination of Care:  1. Have you been to the ER, urgent care clinic since your last visit? Hospitalized since your last visit? no    2. Have you seen or consulted any other health care providers outside of the [facility] since your last visit? Include any pap smears or colon screening. No      Bridget Padilla RN   Clinical Coordinator   [facility] at [facility]